# Patient Record
Sex: FEMALE | Race: WHITE | NOT HISPANIC OR LATINO | Employment: FULL TIME | URBAN - METROPOLITAN AREA
[De-identification: names, ages, dates, MRNs, and addresses within clinical notes are randomized per-mention and may not be internally consistent; named-entity substitution may affect disease eponyms.]

---

## 2023-09-22 ENCOUNTER — HOSPITAL ENCOUNTER (OUTPATIENT)
Dept: DATA CONVERSION | Facility: HOSPITAL | Age: 23
Discharge: PSYCHIATRIC OR SUBSTANCE USE FACILITY | End: 2023-09-23
Payer: COMMERCIAL

## 2023-09-22 DIAGNOSIS — F17.210 NICOTINE DEPENDENCE, CIGARETTES, UNCOMPLICATED: ICD-10-CM

## 2023-09-22 DIAGNOSIS — Z88.0 ALLERGY STATUS TO PENICILLIN: ICD-10-CM

## 2023-09-22 DIAGNOSIS — R45.851 SUICIDAL IDEATIONS: ICD-10-CM

## 2023-09-22 LAB
ALBUMIN SERPL-MCNC: 4.4 GM/DL (ref 3.5–5)
ALBUMIN/GLOB SERPL: 1.7 RATIO (ref 1.5–3)
ALP BLD-CCNC: 84 U/L (ref 35–125)
ALT SERPL-CCNC: 8 U/L (ref 5–40)
AMPHETAMINES UR QL SCN>1000 NG/ML: NEGATIVE
ANION GAP SERPL CALCULATED.3IONS-SCNC: 10 MMOL/L (ref 0–19)
AST SERPL-CCNC: 17 U/L (ref 5–40)
BARBITURATES UR QL SCN>300 NG/ML: NEGATIVE
BASOPHILS # BLD AUTO: 0.09 K/UL (ref 0–0.22)
BASOPHILS NFR BLD AUTO: 1 % (ref 0–1)
BENZODIAZ UR QL SCN>300 NG/ML: NEGATIVE
BILIRUB SERPL-MCNC: 0.9 MG/DL (ref 0.1–1.2)
BILIRUB UR QL STRIP.AUTO: ABNORMAL
BUN SERPL-MCNC: 13 MG/DL (ref 8–25)
BUN/CREAT SERPL: 26 RATIO (ref 8–21)
BZE UR QL SCN>300 NG/ML: NEGATIVE
CALCIUM SERPL-MCNC: 8.9 MG/DL (ref 8.5–10.4)
CANNABINOIDS UR QL SCN>50 NG/ML: NORMAL
CHLORIDE SERPL-SCNC: 107 MMOL/L (ref 97–107)
CLARITY UR: CLEAR
CO2 SERPL-SCNC: 23 MMOL/L (ref 24–31)
COLOR UR: YELLOW
CREAT SERPL-MCNC: 0.5 MG/DL (ref 0.4–1.6)
DEPRECATED RDW RBC AUTO: 37.2 FL (ref 37–54)
DIFFERENTIAL METHOD BLD: ABNORMAL
DRUG SCREEN COMMENT UR-IMP: NORMAL
EOSINOPHIL # BLD AUTO: 0.14 K/UL (ref 0–0.45)
EOSINOPHIL NFR BLD: 1.5 % (ref 0–3)
EPITH CASTS #/AREA UR COMP ASSIST: ABNORMAL /HPF
ERYTHROCYTE [DISTWIDTH] IN BLOOD BY AUTOMATED COUNT: 12.2 % (ref 11.7–15)
ETHANOL SERPL-MCNC: <0.01 GM/DL (ref 0–0.01)
FENTANYL+NORFENTANYL UR QL SCN: NORMAL
GFR SERPL CREATININE-BSD FRML MDRD: 133 ML/MIN/1.73 M2
GLOBULIN SER-MCNC: 2.6 G/DL (ref 1.9–3.7)
GLUCOSE SERPL-MCNC: 89 MG/DL (ref 65–99)
GLUCOSE UR STRIP.AUTO-MCNC: NEGATIVE MG/DL
HCG UR QL: NEGATIVE
HCT VFR BLD AUTO: 42.7 % (ref 36–44)
HGB BLD-MCNC: 14.2 GM/DL (ref 12–15)
HGB UR QL STRIP.AUTO: ABNORMAL /HPF (ref 0–3)
HGB UR QL: ABNORMAL
IMM GRANULOCYTES # BLD AUTO: 0.02 K/UL (ref 0–0.1)
KETONES UR QL STRIP.AUTO: NEGATIVE
LEUKOCYTE ESTERASE UR QL STRIP.AUTO: NEGATIVE
LYMPHOCYTES # BLD AUTO: 3.23 K/UL (ref 1.2–3.2)
LYMPHOCYTES NFR BLD MANUAL: 34.6 % (ref 20–40)
MCH RBC QN AUTO: 27.8 PG (ref 26–34)
MCHC RBC AUTO-ENTMCNC: 33.3 % (ref 31–37)
MCV RBC AUTO: 83.6 FL (ref 80–100)
METHADONE UR QL SCN>300 NG/ML: NEGATIVE
MONOCYTES # BLD AUTO: 0.8 K/UL (ref 0–0.8)
MONOCYTES NFR BLD MANUAL: 8.6 % (ref 0–8)
NEUTROPHILS # BLD AUTO: 5.05 K/UL
NEUTROPHILS # BLD AUTO: 5.05 K/UL (ref 1.8–7.7)
NEUTROPHILS.IMMATURE NFR BLD: 0.2 % (ref 0–1)
NEUTS SEG NFR BLD: 54.1 % (ref 50–70)
NITRITE UR QL STRIP.AUTO: NEGATIVE
NOTE (COV): NORMAL
NRBC BLD-RTO: 0 /100 WBC
OPIATES UR QL SCN>300 NG/ML: NEGATIVE
OXYCODONE UR QL: NEGATIVE
PCP UR QL SCN>25 NG/ML: NEGATIVE
PH UR STRIP.AUTO: 6.5 [PH] (ref 4.6–8)
PLATELET # BLD AUTO: 305 K/UL (ref 150–450)
PMV BLD AUTO: 11.4 CU (ref 7–12.6)
POTASSIUM SERPL-SCNC: 4.2 MMOL/L (ref 3.4–5.1)
PROT SERPL-MCNC: 7 G/DL (ref 5.9–7.9)
PROT UR STRIP.AUTO-MCNC: ABNORMAL MG/DL
RBC # BLD AUTO: 5.11 M/UL (ref 4–4.9)
REFLEX MICROSCOPIC (UA): ABNORMAL
SARS-COV-2 RNA RESP QL NAA+PROBE: NEGATIVE
SODIUM SERPL-SCNC: 140 MMOL/L (ref 133–145)
SP GR UR STRIP.AUTO: >=1.03 (ref 1–1.03)
SPECIMEN SOURCE (COV): NORMAL
UROBILINOGEN UR QL STRIP.AUTO: 0.2 MG/DL (ref 0–1)
WBC # BLD AUTO: 9.3 K/UL (ref 4.5–11)
WBC #/AREA URNS AUTO: ABNORMAL /HPF (ref 0–3)

## 2023-10-01 ENCOUNTER — HOSPITAL ENCOUNTER (EMERGENCY)
Facility: HOSPITAL | Age: 23
Discharge: OTHER INSTITUTION | End: 2023-10-01
Attending: EMERGENCY MEDICINE
Payer: COMMERCIAL

## 2023-10-01 VITALS
WEIGHT: 130.29 LBS | RESPIRATION RATE: 15 BRPM | BODY MASS INDEX: 24.6 KG/M2 | SYSTOLIC BLOOD PRESSURE: 127 MMHG | TEMPERATURE: 97.9 F | OXYGEN SATURATION: 98 % | DIASTOLIC BLOOD PRESSURE: 82 MMHG | HEART RATE: 88 BPM | HEIGHT: 61 IN

## 2023-10-01 DIAGNOSIS — F23 ACUTE PSYCHOSIS (MULTI): Primary | ICD-10-CM

## 2023-10-01 LAB
AMPHETAMINES UR QL SCN>1000 NG/ML: NEGATIVE
ANION GAP SERPL CALC-SCNC: 10 MMOL/L
BARBITURATES UR QL SCN>300 NG/ML: NEGATIVE
BASOPHILS # BLD AUTO: 0.09 X10*3/UL (ref 0–0.1)
BASOPHILS NFR BLD AUTO: 1.2 %
BENZODIAZ UR QL SCN>300 NG/ML: NEGATIVE
BUN SERPL-MCNC: 22 MG/DL (ref 8–25)
BZE UR QL SCN>300 NG/ML: NEGATIVE
CALCIUM SERPL-MCNC: 9 MG/DL (ref 8.5–10.4)
CANNABINOIDS UR QL SCN>50 NG/ML: POSITIVE
CHLORIDE SERPL-SCNC: 106 MMOL/L (ref 97–107)
CO2 SERPL-SCNC: 23 MMOL/L (ref 24–31)
CREAT SERPL-MCNC: 0.4 MG/DL (ref 0.4–1.6)
EOSINOPHIL # BLD AUTO: 0.22 X10*3/UL (ref 0–0.7)
EOSINOPHIL NFR BLD AUTO: 2.9 %
ERYTHROCYTE [DISTWIDTH] IN BLOOD BY AUTOMATED COUNT: 12.5 % (ref 11.5–14.5)
ETHANOL SERPL-MCNC: <0.01 G/DL
FENTANYL+NORFENTANYL UR QL SCN: NEGATIVE
GFR SERPL CREATININE-BSD FRML MDRD: >90 ML/MIN/1.73M*2
GLUCOSE SERPL-MCNC: 92 MG/DL (ref 65–99)
HCG UR QL IA.RAPID: NEGATIVE
HCT VFR BLD AUTO: 40.5 % (ref 36–46)
HGB BLD-MCNC: 13.2 G/DL (ref 12–16)
IMM GRANULOCYTES # BLD AUTO: 0.02 X10*3/UL (ref 0–0.7)
IMM GRANULOCYTES NFR BLD AUTO: 0.3 % (ref 0–0.9)
LYMPHOCYTES # BLD AUTO: 3.05 X10*3/UL (ref 1.2–4.8)
LYMPHOCYTES NFR BLD AUTO: 40.4 %
Lab: NORMAL
MCH RBC QN AUTO: 27.7 PG (ref 26–34)
MCHC RBC AUTO-ENTMCNC: 32.6 G/DL (ref 32–36)
MCV RBC AUTO: 85 FL (ref 80–100)
METHADONE UR QL SCN>300 NG/ML: NEGATIVE
MONOCYTES # BLD AUTO: 0.81 X10*3/UL (ref 0.1–1)
MONOCYTES NFR BLD AUTO: 10.7 %
NEUTROPHILS # BLD AUTO: 3.36 X10*3/UL (ref 1.2–7.7)
NEUTROPHILS NFR BLD AUTO: 44.5 %
NRBC BLD-RTO: 0 /100 WBCS (ref 0–0)
OPIATES UR QL SCN>300 NG/ML: NEGATIVE
OXYCODONE UR QL: NEGATIVE
PCP UR QL SCN>25 NG/ML: NEGATIVE
PLATELET # BLD AUTO: 239 X10*3/UL (ref 150–450)
PMV BLD AUTO: 11.3 FL (ref 7.5–11.5)
POTASSIUM SERPL-SCNC: 4.1 MMOL/L (ref 3.4–5.1)
RBC # BLD AUTO: 4.77 X10*6/UL (ref 4–5.2)
SARS-COV-2 RNA RESP QL NAA+PROBE: NOT DETECTED
SODIUM SERPL-SCNC: 139 MMOL/L (ref 133–145)
WBC # BLD AUTO: 7.6 X10*3/UL (ref 4.4–11.3)

## 2023-10-01 PROCEDURE — 85025 COMPLETE CBC W/AUTO DIFF WBC: CPT | Performed by: EMERGENCY MEDICINE

## 2023-10-01 PROCEDURE — 82077 ASSAY SPEC XCP UR&BREATH IA: CPT | Performed by: EMERGENCY MEDICINE

## 2023-10-01 PROCEDURE — 81025 URINE PREGNANCY TEST: CPT | Performed by: EMERGENCY MEDICINE

## 2023-10-01 PROCEDURE — 80048 BASIC METABOLIC PNL TOTAL CA: CPT | Performed by: EMERGENCY MEDICINE

## 2023-10-01 PROCEDURE — 90839 PSYTX CRISIS INITIAL 60 MIN: CPT

## 2023-10-01 PROCEDURE — 36415 COLL VENOUS BLD VENIPUNCTURE: CPT | Performed by: EMERGENCY MEDICINE

## 2023-10-01 PROCEDURE — 99285 EMERGENCY DEPT VISIT HI MDM: CPT | Mod: 25

## 2023-10-01 PROCEDURE — 87635 SARS-COV-2 COVID-19 AMP PRB: CPT | Performed by: EMERGENCY MEDICINE

## 2023-10-01 PROCEDURE — 2500000004 HC RX 250 GENERAL PHARMACY W/ HCPCS (ALT 636 FOR OP/ED)

## 2023-10-01 PROCEDURE — 99284 EMERGENCY DEPT VISIT MOD MDM: CPT | Performed by: EMERGENCY MEDICINE

## 2023-10-01 PROCEDURE — 80307 DRUG TEST PRSMV CHEM ANLYZR: CPT | Performed by: EMERGENCY MEDICINE

## 2023-10-01 RX ORDER — LORAZEPAM 2 MG/ML
INJECTION INTRAMUSCULAR
Status: COMPLETED
Start: 2023-10-01 | End: 2023-10-01

## 2023-10-01 RX ORDER — HALOPERIDOL 5 MG/ML
INJECTION INTRAMUSCULAR
Status: COMPLETED
Start: 2023-10-01 | End: 2023-10-01

## 2023-10-01 RX ADMIN — HALOPERIDOL LACTATE 5 MG: 5 INJECTION, SOLUTION INTRAMUSCULAR at 04:10

## 2023-10-01 RX ADMIN — LORAZEPAM 2 MG: 2 INJECTION, SOLUTION INTRAMUSCULAR; INTRAVENOUS at 04:10

## 2023-10-01 SDOH — HEALTH STABILITY: MENTAL HEALTH: HOW DID YOU TRY TO KILL YOURSELF?: KNIFE TO THROAT

## 2023-10-01 SDOH — HEALTH STABILITY: MENTAL HEALTH

## 2023-10-01 SDOH — HEALTH STABILITY: MENTAL HEALTH: IN THE PAST FEW WEEKS, HAVE YOU FELT THAT YOU OR YOUR FAMILY WOULD BE BETTER OFF IF YOU WERE DEAD?: YES

## 2023-10-01 SDOH — HEALTH STABILITY: MENTAL HEALTH: IN THE PAST WEEK, HAVE YOU BEEN HAVING THOUGHTS ABOUT KILLING YOURSELF?: YES

## 2023-10-01 SDOH — HEALTH STABILITY: MENTAL HEALTH: IN THE PAST FEW WEEKS, HAVE YOU WISHED YOU WERE DEAD?: YES

## 2023-10-01 SDOH — HEALTH STABILITY: MENTAL HEALTH: DEPRESSION SYMPTOMS: FEELINGS OF HOPELESSESS;FEELINGS OF WORTHLESSNESS

## 2023-10-01 SDOH — HEALTH STABILITY: MENTAL HEALTH: ARE YOU HAVING THOUGHTS OF KILLING YOURSELF RIGHT NOW?: NO

## 2023-10-01 SDOH — ECONOMIC STABILITY: HOUSING INSECURITY: FEELS SAFE LIVING IN HOME: YES

## 2023-10-01 SDOH — HEALTH STABILITY: MENTAL HEALTH: HAVE YOU EVER TRIED TO KILL YOURSELF?: YES

## 2023-10-01 ASSESSMENT — PAIN DESCRIPTION - PROGRESSION: CLINICAL_PROGRESSION: NOT CHANGED

## 2023-10-01 ASSESSMENT — PAIN - FUNCTIONAL ASSESSMENT: PAIN_FUNCTIONAL_ASSESSMENT: 0-10

## 2023-10-01 ASSESSMENT — LIFESTYLE VARIABLES
SUBSTANCE_ABUSE_PAST_12_MONTHS: NO
PRESCIPTION_ABUSE_PAST_12_MONTHS: NO

## 2023-10-01 ASSESSMENT — PAIN SCALES - GENERAL: PAINLEVEL_OUTOF10: 0 - NO PAIN

## 2023-10-01 NOTE — ED NOTES
Problem: Respiratory Impairment - Respiratory Therapy 253  Intervention: Respiratory support devices  Note: Intervention Status  Done      Pt being pinked slipped by SW, pt in restroom getting changed, pt is putting on green scrubs per hospital policy. Pt is also giving urine sample and blood work. Pt bed is being removed because pt was standing on bed when asked not to she replied just take it.    Belongings are as followed   Shirt  Shorts  Shoes   IN LOCKER NO 3.         Mechelle Guillory, EMT  10/01/23 0352

## 2023-10-01 NOTE — ED NOTES
"Pt moved into perdue with recliner because patient was in room tearing paint of wall, after bed was removed. Paramedic getting blood work and pt stated \"just shoot me or stab me with big needle'. Pt took glasses off broke them in half with her bare hands, pt throw glasses on floor.  Glasses recovered lens came out. Nurse Demar medicated pt to keep from escalating behavior with [facility] [employer] present. Pt asked for crackers \"elina crackers not regular crackers because I'm a cracker\". Kahlil giving pt warm blanket and more snacks. MERLE Guillory, EMT  10/01/23 5096       Mechelle Guillory, EMT  10/01/23 4218       Janet Benz, TUCKERN  10/01/23 6556    " Tarsorrhaphy Text: A tarsorrhaphy was performed using Frost sutures.

## 2023-10-01 NOTE — ED PROVIDER NOTES
HPI   Chief Complaint   Patient presents with    Psychiatric Evaluation     Pt brought in by PD for a voluntary eval. According to PD pt was screaming and breaking things outside and when they arrived she stated she would like help.  Pt not a good historian as to why she she is seeking help, does not respond appropriately to questions asked.       23-year-old female with a history of psychiatric disorder brought in by the police.  She was outside her house.  Screaming and breaking things.  Nursing staff spoke to her father who said she had been admitted earlier this week.  Now has symptoms of acute psychosis with hallucinations.    With speaking the patient she is on affect and behavior.  She appears to be talking to herself.  At times got agitated and violent.  Unable to obtain history from patient because of acute condition.                          No data recorded                Patient History   No past medical history on file.  No past surgical history on file.  No family history on file.  Social History     Tobacco Use    Smoking status: Not on file    Smokeless tobacco: Not on file   Substance Use Topics    Alcohol use: Not on file    Drug use: Not on file       Physical Exam   ED Triage Vitals [10/01/23 0221]   Temp Heart Rate Resp BP   36.3 °C (97.3 °F) 79 19 127/82      SpO2 Temp Source Heart Rate Source Patient Position   98 % Oral Monitor Sitting      BP Location FiO2 (%)     -- --       Physical Exam  Vitals and nursing note reviewed.     Constitutional:  Awake & alert. No acute distress.  Head: Atraumatic.  Eyes: Sclerae are anicteric and not injected.  ENT: Airway is patent.  Neck: Neck is supple and non-tender. No stridor.  Cardiovascular: Regular rate.  Pulmonary/Chest: Clear to auscultation bilaterally. No distress.  GI: Soft and non-distended. There is no discomfort with palpation.   Extremities: Full range of motion in all extremities and no deformity.  Neurological: Alert, awake.  Moving all  extremities.  No signs of injury.  Skin: Skin is warm and dry.  Psychiatric: Uncooperative.  Bizarre behavior.  Talking to herself.  Agitated.  Combative..    ED Course & MDM   Diagnoses as of 10/01/23 0818   Acute psychosis (CMS/[facility])     No complaints.  Well-appearing.    Medical Decision Making  Patient is medically cleared for evaluation by Psychiatry.  Does not have any acute medical conditions that would interfere with evaluation treatment by Psychiatry at this time. Interviewed and examined patient.  Reviewed labs.  No significant metabolic or electrolyte abnormality.  No signs of injury.  Screen positive for marijuana    Patient evaluated crisis worker.  Waiting for transfer to psychiatric facility for treatment of acute psychosis.    Required Haldol Ativan for sedation after patient was agitated    Accepted for transfer to [facility] for treatment of acute psychosis          Procedure  Procedures            Juan Pratt MD  10/01/23 7117

## 2023-10-01 NOTE — ED NOTES
Gave report to Antonieta GOLDSTEIN at [facility], [facility] was called transportation was set up.      Radha Hernández LPN  10/01/23 0858

## 2023-10-01 NOTE — ED NOTES
Pt in recliner in room sleeping. Bed frame remains out of the room per pt request.     Janet Benz, LPN  10/01/23 0617

## 2023-10-01 NOTE — SIGNIFICANT EVENT
10/01/23 0300   Arrival Details   Mode of Arrival Ambulance   Admission Source Home   Admission Type Involuntary   EPAT Assessment Start Date 10/01/23   EPAT Assessment Start Time 0312   Name of  RADHA Richard Readmission Information    Readmission within 30 Days Yes   Previous ED Visit Date and Reason  9/22/2023  (Psychosis)   Previous Discharge Date and Location 9/23/2023 [facility]   Factors Contributing to  Readmission Inpatient/ED (Team Perspective) Med Compliance/Difficulty Obtaining;Lack of Community Support;Failed to Keep Follow-Up Appointments;Discharge Plan Did Not Meet Patient Needs   Where Did You Go When You Left the Hospitals Last Time Home   Was Family/Friend Involved in Your Discharge Plan Yes   Did You Take Your Medications as Prescribed Yes   Did You Keep the Appointment Yes   What Prevented You From Going to the Appointments Patient was asked to leave IOP program due to increased behaviors   Psychiatric Symptoms   Depression Symptoms Feelings of hopelessess;Feelings of worthlessness   Crista Symptoms Poor judgment;Flight of ideas;Increased energy   Psychosis Symptoms   Delusion Type Broadcasting;Grandeur   Past Psychiatric History/Meds/Treatments   Past Psychiatric History Patient was just placed at [facility]   Support System   Support System Immediate family   Living Arrangement   Living Arrangement House;Lives with someone   Home Safety   Feels Safe Living in Home Yes   Drug Screening   Have you used any substances (canabis, cocaine, heroin, hallucinogens, inhalants, etc.) in the past 12 months? No   Have you used any prescription drugs other than prescribed in the past 12 months? No   Is a toxicology screen needed? Yes   Stage of Change   Stage of Change Precontemplation   History of Treatment Inpatient;IOP   Type of Treatment Offered Inpatient;IOP   General Appearance   Motor Activity Restlessness;Freedom of movement;Hyperactivity   Speech Pattern Excessively  loud;Perseverative;Word salad   General Attitude Cooperative;Attentive;Interested   Thought Process   Coherency Blocking;Flight of ideas;Tangential   Content Delusions   Delusions Grandeur   Perception Derealization   Cognition Impulsive;Poor judgement;Poor attention/concentration;Follows commands   Risk Factors   Self Harm/Suicidal Ideation Plan Patient endorsing some suicidal ideations. Pt has no plan   Previous Self Harm/Suicidal Plans Pt has hx of SI as well as an attempt occurring in March of this year   Risk Factors Major mental illness;Access to weapons;Lower socioeconomic status   Violence Risk Assessment   Assessment of Violence None noted   Thoughts of Harm to Others No   Ability to Assess Risk Screen   Risk Screen - Ability to Assess Able to be screened   Ask Suicide-Screening Questions   1. In the past few weeks, have you wished you were dead? Y   2. In the past few weeks, have you felt that you or your family would be better off if you were dead? Y   3. In the past week, have you been having thoughts about killing yourself? Y   4. Have you ever tried to kill yourself? Y   How did you try to kill yourself? Knife to throat   When did you try to kill yourself? MARCH 2023   5. Are you having thoughts of killing yourself right now? N   Calculated Risk Score Potential Risk   Step 1: Risk Factors   Current & Past Psychiatric Dx Mood disorder   Precipitants/Stressors Inadequate social supports   Change in Treatment Change in provider or treament (i.e., medications, psychotherapy, milieu);Recent inpatient discharge   Step 2: Protective Factors    Protective Factors Internal Fear of death or the actual act of killing self   Protective Factors External Cultural, spiritual and/or moral attitudes against suicide;Supportive social network or family or friends   Step 3: Suicidal Ideation Intensity   How Many Times Have You Had These Thoughts 3   When You Have the Thoughts How Long do They Last  2   Could/Can You Stop  "Thinking About Killing Yourself or Wanting to Die if You Want to 3   Are There Things - Anyone or Anything - That Stopped You From Wanting to Die or Acting on 3   What Sort of Reasons Did You Have For Thinking About Wanting to Die or Killing Yourself 4   Total Score 15   Step 5: Documentation   Risk Level Moderate suicide risk   Outcome/Disposition   EPAT Assessment Completed Date 10/01/23   EPAT Assessment Completed Time 0426   22yo female presents to the ED due to jose guadalupe and psychosis. Community members called PD due to pt being outside with loud volume and destroying property. Upon arrival to the ED, pt is nonsensical upon assessment. Pt has tangential speech, and illogical thoughts. She displays a lot of thought blocking and appeared somewhat internally stimulated. Client endorsed some suicidal ideations upon assessment and made statements including, \"I do not want to be here anymore.\" \"Just cut my wrist open so I can die.\" Pt denies substance usage and denies hallucinations. Per collateral from pt father, she has a dx of bipolar disorder as well as anxiety. Pt was just placed at [facility] on 9/23 and discharged on 9/28 with [facility] services. Pt attempted to go to [facility] but was discharged from program due to behaviors. Pt reports she is not compliant with medications and father reports she is prescribed Hydroxyzine PRN and Escitalopram as well as Quetiapine PRN. Pt has a therapist ([facility]). Pt has psychiatry services via [facility] ([facility]). Pt has been placed at [facility] in September 2023. Pt was also placed at [facility] in March 2023 and [facility] in January 2020 due to SI. Pt requires inpatient tx for safety and stabilization.   "

## 2023-10-01 NOTE — ED NOTES
Patient was accepted at [facility] by Dr Banks. Room 214 A. Nurse to nurse report can be called to [phone].

## 2023-12-08 ENCOUNTER — TELEPHONE (OUTPATIENT)
Dept: PRIMARY CARE | Facility: CLINIC | Age: 23
End: 2023-12-08
Payer: COMMERCIAL

## 2023-12-08 NOTE — TELEPHONE ENCOUNTER
Pt's Father Refugio called, he said the Patient is Bi [employer] and wants to know if [facility] manages [facility] [employer] medications or can refer to a mental health provider. Please advise call Refugio [phone].

## 2024-03-04 ENCOUNTER — OFFICE VISIT (OUTPATIENT)
Dept: PRIMARY CARE | Facility: CLINIC | Age: 24
End: 2024-03-04
Payer: COMMERCIAL

## 2024-03-04 ENCOUNTER — LAB (OUTPATIENT)
Dept: LAB | Facility: LAB | Age: 24
End: 2024-03-04
Payer: COMMERCIAL

## 2024-03-04 VITALS
HEART RATE: 96 BPM | DIASTOLIC BLOOD PRESSURE: 68 MMHG | WEIGHT: 143 LBS | SYSTOLIC BLOOD PRESSURE: 102 MMHG | OXYGEN SATURATION: 97 % | BODY MASS INDEX: 27.02 KG/M2

## 2024-03-04 DIAGNOSIS — R11.2 NAUSEA AND VOMITING, UNSPECIFIED VOMITING TYPE: Primary | ICD-10-CM

## 2024-03-04 DIAGNOSIS — R11.2 NAUSEA AND VOMITING, UNSPECIFIED VOMITING TYPE: ICD-10-CM

## 2024-03-04 DIAGNOSIS — R89.9 ABNORMAL LABORATORY TEST RESULT: ICD-10-CM

## 2024-03-04 PROBLEM — F31.70 BIPOLAR DISORDER IN PARTIAL REMISSION (MULTI): Status: ACTIVE | Noted: 2024-03-04

## 2024-03-04 PROBLEM — F41.9 ANXIETY: Status: ACTIVE | Noted: 2024-03-04

## 2024-03-04 LAB
ALBUMIN SERPL-MCNC: 4.3 G/DL (ref 3.5–5)
ALP BLD-CCNC: 68 U/L (ref 35–125)
ALT SERPL-CCNC: 17 U/L (ref 5–40)
AMYLASE SERPL-CCNC: 34 U/L (ref 28–100)
ANION GAP SERPL CALC-SCNC: 13 MMOL/L
APPEARANCE UR: ABNORMAL
AST SERPL-CCNC: 16 U/L (ref 5–40)
BACTERIA #/AREA URNS AUTO: ABNORMAL /HPF
BASOPHILS # BLD AUTO: 0.07 X10*3/UL (ref 0–0.1)
BASOPHILS NFR BLD AUTO: 0.7 %
BILIRUB SERPL-MCNC: 1.3 MG/DL (ref 0.1–1.2)
BILIRUB UR STRIP.AUTO-MCNC: NEGATIVE MG/DL
BUN SERPL-MCNC: 7 MG/DL (ref 8–25)
CALCIUM SERPL-MCNC: 9.4 MG/DL (ref 8.5–10.4)
CHLORIDE SERPL-SCNC: 99 MMOL/L (ref 97–107)
CO2 SERPL-SCNC: 23 MMOL/L (ref 24–31)
COLOR UR: YELLOW
CREAT SERPL-MCNC: 0.5 MG/DL (ref 0.4–1.6)
EGFRCR SERPLBLD CKD-EPI 2021: >90 ML/MIN/1.73M*2
EOSINOPHIL # BLD AUTO: 0.17 X10*3/UL (ref 0–0.7)
EOSINOPHIL NFR BLD AUTO: 1.7 %
ERYTHROCYTE [DISTWIDTH] IN BLOOD BY AUTOMATED COUNT: 12.3 % (ref 11.5–14.5)
GLUCOSE SERPL-MCNC: 71 MG/DL (ref 65–99)
GLUCOSE UR STRIP.AUTO-MCNC: NORMAL MG/DL
HCT VFR BLD AUTO: 42.7 % (ref 36–46)
HGB BLD-MCNC: 14.1 G/DL (ref 12–16)
IMM GRANULOCYTES # BLD AUTO: 0.03 X10*3/UL (ref 0–0.7)
IMM GRANULOCYTES NFR BLD AUTO: 0.3 % (ref 0–0.9)
KETONES UR STRIP.AUTO-MCNC: ABNORMAL MG/DL
LEUKOCYTE ESTERASE UR QL STRIP.AUTO: ABNORMAL
LIPASE SERPL-CCNC: 26 U/L (ref 16–63)
LYMPHOCYTES # BLD AUTO: 3.38 X10*3/UL (ref 1.2–4.8)
LYMPHOCYTES NFR BLD AUTO: 33.5 %
MCH RBC QN AUTO: 27.9 PG (ref 26–34)
MCHC RBC AUTO-ENTMCNC: 33 G/DL (ref 32–36)
MCV RBC AUTO: 84 FL (ref 80–100)
MONOCYTES # BLD AUTO: 0.79 X10*3/UL (ref 0.1–1)
MONOCYTES NFR BLD AUTO: 7.8 %
MUCOUS THREADS #/AREA URNS AUTO: ABNORMAL /LPF
NEUTROPHILS # BLD AUTO: 5.64 X10*3/UL (ref 1.2–7.7)
NEUTROPHILS NFR BLD AUTO: 56 %
NITRITE UR QL STRIP.AUTO: NEGATIVE
NRBC BLD-RTO: 0 /100 WBCS (ref 0–0)
PH UR STRIP.AUTO: 6.5 [PH]
PLATELET # BLD AUTO: 300 X10*3/UL (ref 150–450)
POTASSIUM SERPL-SCNC: 5 MMOL/L (ref 3.4–5.1)
PROT SERPL-MCNC: 7 G/DL (ref 5.9–7.9)
PROT UR STRIP.AUTO-MCNC: ABNORMAL MG/DL
RBC # BLD AUTO: 5.06 X10*6/UL (ref 4–5.2)
RBC # UR STRIP.AUTO: NEGATIVE /UL
RBC #/AREA URNS AUTO: ABNORMAL /HPF
SODIUM SERPL-SCNC: 135 MMOL/L (ref 133–145)
SP GR UR STRIP.AUTO: 1.02
SQUAMOUS #/AREA URNS AUTO: ABNORMAL /HPF
UROBILINOGEN UR STRIP.AUTO-MCNC: NORMAL MG/DL
WBC # BLD AUTO: 10.1 X10*3/UL (ref 4.4–11.3)
WBC #/AREA URNS AUTO: ABNORMAL /HPF
YEAST BUDDING #/AREA UR COMP ASSIST: PRESENT /HPF

## 2024-03-04 PROCEDURE — 81001 URINALYSIS AUTO W/SCOPE: CPT

## 2024-03-04 PROCEDURE — 36415 COLL VENOUS BLD VENIPUNCTURE: CPT

## 2024-03-04 PROCEDURE — 80074 ACUTE HEPATITIS PANEL: CPT

## 2024-03-04 PROCEDURE — 82150 ASSAY OF AMYLASE: CPT

## 2024-03-04 PROCEDURE — 80053 COMPREHEN METABOLIC PANEL: CPT

## 2024-03-04 PROCEDURE — 99214 OFFICE O/P EST MOD 30 MIN: CPT | Performed by: INTERNAL MEDICINE

## 2024-03-04 PROCEDURE — 82977 ASSAY OF GGT: CPT

## 2024-03-04 PROCEDURE — 83690 ASSAY OF LIPASE: CPT

## 2024-03-04 PROCEDURE — 4004F PT TOBACCO SCREEN RCVD TLK: CPT | Performed by: INTERNAL MEDICINE

## 2024-03-04 PROCEDURE — 85025 COMPLETE CBC W/AUTO DIFF WBC: CPT

## 2024-03-04 RX ORDER — PANTOPRAZOLE SODIUM 40 MG/1
40 TABLET, DELAYED RELEASE ORAL DAILY
Qty: 30 TABLET | Refills: 5 | Status: SHIPPED | OUTPATIENT
Start: 2024-03-04 | End: 2024-08-31

## 2024-03-04 RX ORDER — HYDROXYZINE PAMOATE 50 MG/1
50 CAPSULE ORAL 3 TIMES DAILY PRN
COMMUNITY
Start: 2023-11-07

## 2024-03-04 RX ORDER — CARIPRAZINE 1.5 MG/1
1.5 CAPSULE, GELATIN COATED ORAL EVERY MORNING
COMMUNITY
Start: 2024-02-28

## 2024-03-04 RX ORDER — ESCITALOPRAM OXALATE 5 MG/1
5 TABLET ORAL
COMMUNITY
Start: 2024-02-28

## 2024-03-04 RX ORDER — BENZTROPINE MESYLATE 0.5 MG/1
0.5 TABLET ORAL DAILY
COMMUNITY
Start: 2024-02-28

## 2024-03-04 ASSESSMENT — ENCOUNTER SYMPTOMS
OCCASIONAL FEELINGS OF UNSTEADINESS: 0
LOSS OF SENSATION IN FEET: 0
DEPRESSION: 0

## 2024-03-04 ASSESSMENT — PATIENT HEALTH QUESTIONNAIRE - PHQ9
1. LITTLE INTEREST OR PLEASURE IN DOING THINGS: NOT AT ALL
SUM OF ALL RESPONSES TO PHQ9 QUESTIONS 1 AND 2: 0
2. FEELING DOWN, DEPRESSED OR HOPELESS: NOT AT ALL

## 2024-03-04 ASSESSMENT — PAIN SCALES - GENERAL: PAINLEVEL: 0-NO PAIN

## 2024-03-04 NOTE — PROGRESS NOTES
Subjective   Patient ID: Hortensia Castillo is a 23 y.o. female who presents for Vomiting.    Here with mom    For last 2 weeks has been vomiting. Negative home covid test. Appetite ok but about 1 hr later will vomit-isn't occurring every time she eats. Feels nauseated before throws up--will have burning sensation in middle of chest before she throws up. Denies any abdominal pain. Not losing weight    Was in psychiatric facility in Oct then residential facility. Cogentin added about 6 wks ago--was on it few weeks before vomiting started    Tobacco use--continues to smoke        Review of Systems   Genitourinary:         No chance of pregnancy-no sex in 2 yrs       Objective   /68 (BP Location: Left arm, Patient Position: Sitting)   Pulse 96   Wt 64.9 kg (143 lb)   SpO2 97%   BMI 27.02 kg/m²     Physical Exam  Constitutional:       General: She is not in acute distress.     Appearance: She is not ill-appearing.   HENT:      Mouth/Throat:      Comments: Moist mucus membranes  Abdominal:      General: Abdomen is flat. Bowel sounds are normal. There is no distension.      Palpations: Abdomen is soft.      Tenderness: There is no abdominal tenderness.         Assessment/Plan   Diagnoses and all orders for this visit:  Nausea and vomiting, unspecified vomiting type  -     CBC and Auto Differential; Future  -     Comprehensive Metabolic Panel; Future  -     Amylase; Future  -     Lipase; Future  -     Urinalysis with Reflex Microscopic; Future  -     pantoprazole (ProtoNix) 40 mg EC tablet; Take 1 tablet (40 mg) by mouth once daily. Do not crush, chew, or split.    Will check labs while starting protonix

## 2024-03-04 NOTE — LETTER
March 6, 2024     Patient: Hortensia Castillo   YOB: 2000   Date of Visit: 3/4/2024       To Whom It May Concern:    Hortensia Castillo was seen in my clinic on 3/4/2024 at 2:15 pm. Please excuse Hortensia for her absence from work on this day to make the appointment.    If you have any questions or concerns, please don't hesitate to call.         Sincerely,         Carmita Martinez MD        CC: No Recipients

## 2024-03-05 ENCOUNTER — TELEPHONE (OUTPATIENT)
Dept: PRIMARY CARE | Facility: CLINIC | Age: 24
End: 2024-03-05
Payer: COMMERCIAL

## 2024-03-05 DIAGNOSIS — R11.10 VOMITING, UNSPECIFIED VOMITING TYPE, UNSPECIFIED WHETHER NAUSEA PRESENT: ICD-10-CM

## 2024-03-05 DIAGNOSIS — R89.9 ABNORMAL LABORATORY TEST RESULT: ICD-10-CM

## 2024-03-05 DIAGNOSIS — N39.0 ACUTE UTI: Primary | ICD-10-CM

## 2024-03-05 LAB
GGT SERPL-CCNC: 14 U/L (ref 5–55)
HAV IGM SER QL: NONREACTIVE
HBV CORE IGM SER QL: NONREACTIVE
HBV SURFACE AG SERPL QL IA: NONREACTIVE
HCV AB SER QL: NONREACTIVE

## 2024-03-05 RX ORDER — FLUCONAZOLE 150 MG/1
150 TABLET ORAL ONCE
Qty: 1 TABLET | Refills: 0 | Status: SHIPPED | OUTPATIENT
Start: 2024-03-05 | End: 2024-03-05

## 2024-03-05 RX ORDER — SULFAMETHOXAZOLE AND TRIMETHOPRIM 800; 160 MG/1; MG/1
1 TABLET ORAL 2 TIMES DAILY
Qty: 6 TABLET | Refills: 0 | Status: SHIPPED | OUTPATIENT
Start: 2024-03-05 | End: 2024-03-08

## 2024-03-05 NOTE — RESULT ENCOUNTER NOTE
Bilirubin elevated-add hepatitis panel, GGTP and RUQ US; shows a bladder infection and UTI-where does he want pills

## 2024-03-06 ENCOUNTER — TELEPHONE (OUTPATIENT)
Dept: PRIMARY CARE | Facility: CLINIC | Age: 24
End: 2024-03-06
Payer: COMMERCIAL

## 2024-03-11 ENCOUNTER — HOSPITAL ENCOUNTER (OUTPATIENT)
Dept: RADIOLOGY | Facility: HOSPITAL | Age: 24
Discharge: HOME | End: 2024-03-11
Payer: COMMERCIAL

## 2024-03-11 DIAGNOSIS — R89.9 ABNORMAL LABORATORY TEST RESULT: ICD-10-CM

## 2024-03-11 DIAGNOSIS — R11.10 VOMITING, UNSPECIFIED VOMITING TYPE, UNSPECIFIED WHETHER NAUSEA PRESENT: ICD-10-CM

## 2024-03-11 PROCEDURE — 76705 ECHO EXAM OF ABDOMEN: CPT

## 2024-03-11 PROCEDURE — 76705 ECHO EXAM OF ABDOMEN: CPT | Performed by: RADIOLOGY

## 2024-03-18 ENCOUNTER — OFFICE VISIT (OUTPATIENT)
Dept: PRIMARY CARE | Facility: CLINIC | Age: 24
End: 2024-03-18
Payer: COMMERCIAL

## 2024-03-18 VITALS
DIASTOLIC BLOOD PRESSURE: 70 MMHG | SYSTOLIC BLOOD PRESSURE: 110 MMHG | OXYGEN SATURATION: 98 % | WEIGHT: 155 LBS | BODY MASS INDEX: 29.29 KG/M2 | HEART RATE: 98 BPM

## 2024-03-18 DIAGNOSIS — R11.11 VOMITING WITHOUT NAUSEA, UNSPECIFIED VOMITING TYPE: Primary | ICD-10-CM

## 2024-03-18 PROCEDURE — 99213 OFFICE O/P EST LOW 20 MIN: CPT | Performed by: INTERNAL MEDICINE

## 2024-03-18 PROCEDURE — 4004F PT TOBACCO SCREEN RCVD TLK: CPT | Performed by: INTERNAL MEDICINE

## 2024-03-18 ASSESSMENT — ENCOUNTER SYMPTOMS
DEPRESSION: 0
LOSS OF SENSATION IN FEET: 0
OCCASIONAL FEELINGS OF UNSTEADINESS: 0

## 2024-03-18 ASSESSMENT — PATIENT HEALTH QUESTIONNAIRE - PHQ9
SUM OF ALL RESPONSES TO PHQ9 QUESTIONS 1 AND 2: 0
1. LITTLE INTEREST OR PLEASURE IN DOING THINGS: NOT AT ALL
2. FEELING DOWN, DEPRESSED OR HOPELESS: NOT AT ALL

## 2024-03-18 ASSESSMENT — PAIN SCALES - GENERAL: PAINLEVEL: 0-NO PAIN

## 2024-03-18 ASSESSMENT — COLUMBIA-SUICIDE SEVERITY RATING SCALE - C-SSRS
1. IN THE PAST MONTH, HAVE YOU WISHED YOU WERE DEAD OR WISHED YOU COULD GO TO SLEEP AND NOT WAKE UP?: NO
2. HAVE YOU ACTUALLY HAD ANY THOUGHTS OF KILLING YOURSELF?: NO

## 2024-03-18 NOTE — PROGRESS NOTES
Subjective   Patient ID: Hortensia Castillo is a 24 y.o. female who presents for Follow-up.    Still vomitting in the mornings. Almost immediately on rising and no nausea,  Not every morning. Hasn't talked to psych about this in detail. Again denies any pain. Doesn't occur elsewhere in the day. Upset that she's gaining weight. Taking protonix each morning, finished ATB and still vomitting    Reviewed labs and US with patient and mother-no answer there              Objective   /70   Pulse 98   Wt 70.3 kg (155 lb)   SpO2 98%   BMI 29.29 kg/m²     Physical Exam won't meet my eyes    Assessment/Plan   Diagnoses and all orders for this visit:  Vomiting without nausea, unspecified vomiting type    Since occurs on rising-anti-nausea pills likely won't help. Would advise discussing further with psych-? Med SE vs anxiety induced.  If no improvement, will refer for EGD

## 2024-04-29 ENCOUNTER — LAB (OUTPATIENT)
Dept: LAB | Facility: LAB | Age: 24
End: 2024-04-29
Payer: COMMERCIAL

## 2024-04-29 DIAGNOSIS — Z13.79 ENCOUNTER FOR OTHER SCREENING FOR GENETIC AND CHROMOSOMAL ANOMALIES: ICD-10-CM

## 2024-04-29 DIAGNOSIS — Z34.02 ENCOUNTER FOR SUPERVISION OF NORMAL FIRST PREGNANCY, SECOND TRIMESTER (HHS-HCC): Primary | ICD-10-CM

## 2024-04-29 LAB
ABO GROUP (TYPE) IN BLOOD: NORMAL
ANTIBODY SCREEN: NORMAL
ERYTHROCYTE [DISTWIDTH] IN BLOOD BY AUTOMATED COUNT: 12.6 % (ref 11.5–14.5)
EST. AVERAGE GLUCOSE BLD GHB EST-MCNC: 97 MG/DL
HBA1C MFR BLD: 5 %
HBV SURFACE AG SERPL QL IA: NONREACTIVE
HCT VFR BLD AUTO: 33.8 % (ref 36–46)
HCV AB SER QL: NONREACTIVE
HGB BLD-MCNC: 11.2 G/DL (ref 12–16)
MCH RBC QN AUTO: 28.2 PG (ref 26–34)
MCHC RBC AUTO-ENTMCNC: 33.1 G/DL (ref 32–36)
MCV RBC AUTO: 85 FL (ref 80–100)
NRBC BLD-RTO: 0 /100 WBCS (ref 0–0)
PLATELET # BLD AUTO: 245 X10*3/UL (ref 150–450)
RBC # BLD AUTO: 3.97 X10*6/UL (ref 4–5.2)
RH FACTOR (ANTIGEN D): NORMAL
WBC # BLD AUTO: 9.5 X10*3/UL (ref 4.4–11.3)

## 2024-04-29 PROCEDURE — 87800 DETECT AGNT MULT DNA DIREC: CPT

## 2024-04-29 PROCEDURE — 87340 HEPATITIS B SURFACE AG IA: CPT

## 2024-04-29 PROCEDURE — 88175 CYTOPATH C/V AUTO FLUID REDO: CPT

## 2024-04-29 PROCEDURE — 87389 HIV-1 AG W/HIV-1&-2 AB AG IA: CPT

## 2024-04-29 PROCEDURE — 86780 TREPONEMA PALLIDUM: CPT

## 2024-04-29 PROCEDURE — 86900 BLOOD TYPING SEROLOGIC ABO: CPT

## 2024-04-29 PROCEDURE — 86901 BLOOD TYPING SEROLOGIC RH(D): CPT

## 2024-04-29 PROCEDURE — 86317 IMMUNOASSAY INFECTIOUS AGENT: CPT

## 2024-04-29 PROCEDURE — 85027 COMPLETE CBC AUTOMATED: CPT

## 2024-04-29 PROCEDURE — 86803 HEPATITIS C AB TEST: CPT

## 2024-04-29 PROCEDURE — 86850 RBC ANTIBODY SCREEN: CPT

## 2024-04-29 PROCEDURE — 36415 COLL VENOUS BLD VENIPUNCTURE: CPT

## 2024-04-29 PROCEDURE — 83036 HEMOGLOBIN GLYCOSYLATED A1C: CPT

## 2024-04-30 ENCOUNTER — LAB REQUISITION (OUTPATIENT)
Dept: LAB | Facility: HOSPITAL | Age: 24
End: 2024-04-30
Payer: COMMERCIAL

## 2024-04-30 ENCOUNTER — TELEPHONE (OUTPATIENT)
Dept: PRIMARY CARE | Facility: CLINIC | Age: 24
End: 2024-04-30
Payer: COMMERCIAL

## 2024-04-30 DIAGNOSIS — Z01.419 ENCOUNTER FOR GYNECOLOGICAL EXAMINATION (GENERAL) (ROUTINE) WITHOUT ABNORMAL FINDINGS: ICD-10-CM

## 2024-04-30 DIAGNOSIS — Z11.51 ENCOUNTER FOR SCREENING FOR HUMAN PAPILLOMAVIRUS (HPV): ICD-10-CM

## 2024-04-30 DIAGNOSIS — Z11.3 ENCOUNTER FOR SCREENING FOR INFECTIONS WITH A PREDOMINANTLY SEXUAL MODE OF TRANSMISSION: ICD-10-CM

## 2024-04-30 LAB
C TRACH RRNA SPEC QL NAA+PROBE: NEGATIVE
HIV 1+2 AB+HIV1 P24 AG SERPL QL IA: NONREACTIVE
N GONORRHOEA DNA SPEC QL PROBE+SIG AMP: NEGATIVE
RUBV IGG SERPL IA-ACNC: 1.4 IA
RUBV IGG SERPL QL IA: POSITIVE
SCAN RESULT: NORMAL
TREPONEMA PALLIDUM IGG+IGM AB [PRESENCE] IN SERUM OR PLASMA BY IMMUNOASSAY: NONREACTIVE

## 2024-04-30 NOTE — TELEPHONE ENCOUNTER
Patient stated she is 18 weeks pregnant and inquired if she should continue taking Pantoprazole. Please advise.

## 2024-05-01 LAB — SCAN RESULT: NORMAL

## 2024-05-08 LAB
CYTOLOGY CMNT CVX/VAG CYTO-IMP: NORMAL
LAB AP HPV GENOTYPE QUESTION: YES
LAB AP HPV HR: NORMAL
LAB AP PAP ADDITIONAL TESTS: NORMAL
LABORATORY COMMENT REPORT: NORMAL
LMP START DATE: NORMAL
PATH REPORT.TOTAL CANCER: NORMAL

## 2024-06-28 ENCOUNTER — LAB (OUTPATIENT)
Dept: LAB | Facility: LAB | Age: 24
End: 2024-06-28
Payer: COMMERCIAL

## 2024-06-28 DIAGNOSIS — Z34.02 ENCOUNTER FOR SUPERVISION OF NORMAL FIRST PREGNANCY, SECOND TRIMESTER (HHS-HCC): Primary | ICD-10-CM

## 2024-06-28 LAB
ERYTHROCYTE [DISTWIDTH] IN BLOOD BY AUTOMATED COUNT: 12.9 % (ref 11.5–14.5)
GLUCOSE 1H P GLC SERPL-MCNC: 151 MG/DL (ref 65–139)
HCT VFR BLD AUTO: 32.9 % (ref 36–46)
HGB BLD-MCNC: 10.4 G/DL (ref 12–16)
MCH RBC QN AUTO: 28 PG (ref 26–34)
MCHC RBC AUTO-ENTMCNC: 31.6 G/DL (ref 32–36)
MCV RBC AUTO: 88 FL (ref 80–100)
NRBC BLD-RTO: 0 /100 WBCS (ref 0–0)
PLATELET # BLD AUTO: 230 X10*3/UL (ref 150–450)
RBC # BLD AUTO: 3.72 X10*6/UL (ref 4–5.2)
WBC # BLD AUTO: 9 X10*3/UL (ref 4.4–11.3)

## 2024-06-28 PROCEDURE — 36415 COLL VENOUS BLD VENIPUNCTURE: CPT

## 2024-06-28 PROCEDURE — 82947 ASSAY GLUCOSE BLOOD QUANT: CPT

## 2024-06-28 PROCEDURE — 85027 COMPLETE CBC AUTOMATED: CPT

## 2024-07-01 DIAGNOSIS — R73.09 OTHER ABNORMAL GLUCOSE: Primary | ICD-10-CM

## 2024-07-03 ENCOUNTER — LAB (OUTPATIENT)
Dept: LAB | Facility: LAB | Age: 24
End: 2024-07-03
Payer: COMMERCIAL

## 2024-07-03 DIAGNOSIS — R73.09 OTHER ABNORMAL GLUCOSE: ICD-10-CM

## 2024-07-03 LAB
GLUCOSE 1H P GLC SERPL-MCNC: 193 MG/DL (ref 65–139)
GLUCOSE 2H P GLC SERPL-MCNC: 186 MG/DL (ref 65–139)
GLUCOSE 3H P GLC SERPL-MCNC: 107 MG/DL (ref 65–139)
GLUCOSE P FAST SERPL-MCNC: 82 MG/DL (ref 69–99)

## 2024-07-03 PROCEDURE — 36415 COLL VENOUS BLD VENIPUNCTURE: CPT

## 2024-07-03 PROCEDURE — 82952 GTT-ADDED SAMPLES: CPT

## 2024-07-03 PROCEDURE — 82951 GLUCOSE TOLERANCE TEST (GTT): CPT

## 2024-07-03 PROCEDURE — 82947 ASSAY GLUCOSE BLOOD QUANT: CPT

## 2024-07-03 PROCEDURE — 82950 GLUCOSE TEST: CPT

## 2024-07-09 ENCOUNTER — TELEPHONE (OUTPATIENT)
Dept: MATERNAL FETAL MEDICINE | Facility: CLINIC | Age: 24
End: 2024-07-09
Payer: COMMERCIAL

## 2024-07-09 NOTE — TELEPHONE ENCOUNTER
Spoke w/ pt's Mom Velvet. Mom requested callback r/t confusion abt date and time of Nutrition consult. (At this time there does not appear to be a Nutrition visit scheduled per pt at any location on 7/16 at 8:45am.)      GDM telephone Nutrition consult scheduled: Thursday 7/11 @ 2:30pm.

## 2024-07-11 ENCOUNTER — NUTRITION (OUTPATIENT)
Dept: OBSTETRICS AND GYNECOLOGY | Facility: CLINIC | Age: 24
End: 2024-07-11
Payer: COMMERCIAL

## 2024-07-11 NOTE — PROGRESS NOTES
Nutrition Assessment     Reason for Visit:  Hortensia Castillo is a 24 y.o. female who presents for Gestational Diabetes     Food And Nutrient Intake:  Food and Nutrient History  Food and Nutrient History: Per pt, reports the following BGs:   F: 88, 95, 106 w/HS ice cream yesterday  B: 122,164 w/Cheerios and 1/2 tsp sugar  L: 117, 89, 130  D: 473 w/tacos, 2 bags chips, 2 bottles raspberry iced tea, 107.     Bedtime: 12am- 2am      Nutrition Diagnosis      Nutrition Diagnosis  Patient has Nutrition Diagnosis: Yes  Nutrition Diagnosis 1: Food and nutrition related knowledge deficit  Related to (1): lack of prior/recent nutrition counseling r/t BG control in pregnancy  As Evidenced by (1): intial nutrition consult    Nutrition Interventions/Recommendations   Food and Nutrition Delivery  Meals & Snacks: Carbohydrate-modified diet  Goals: Recommended Daily carbohydrate distribution:    Breakfast: 1-2 carbohydrate servings (15-30g CHO) --  avoid concentrated sweets/high GI foods: fruit, fruit juices, cold cereal and milk, syrup, jams and jellies, etc.  AM Snack:  1-2 servings   Lunch:  3-4 servings (45-60g CHO)  Midday Snack:  1-2 servings   Dinner: 3-4 servings   HS Snack:  2 servings (30g)    -Be sure to add protein food to carb choices at each meal and snack: cheese, meat, eggs, nuts, peanut butter, etc    -During the day, eat about every 2 - 4 hours.     -Avoid more than 8-10 hours overnight without eating. If more than about 2hrs between dinner and bedtime, have a carb plus protein snack.     -Higher fiber carbohydrates/whole grains are preferred over refined carb choices.    -- Avoid juices and sugar-sweetened beverages. Diet drinks are fine. Try eliminating milk at mealtime if noticing after-meal BG elevations. Unsweetened almond or soymilk beverages are a lower-carb dairy alternative.    -Recommended Dietary Allowance for carbohydrates during pregnancy is a minimum of 175g/day (11-12 servings) to provide 33g/day for  fetal brain development. Many women do well eating a bit less than 175g CHO/day--this is fine, as long as there is no purposeful, severe restriction of carb foods at meals and snacks (ex: Keto or Atkins-style diets for BG control) and no symptoms indicating inadequate carb intake-- loss of weight, feeling dizzy/lightheaded, irritable, confused, excessively hungry, etc.    Pt Education Materials emailed: Yogurt Options; GDM Sample Meals and Snacks.    M visit 7/23. Pt will call or email to advise Nutrition if FBGs remain elevated or continue to increase.     Nutrition Monitoring and Evaluation   Biochemical Data, Medical Tests and Procedures  Monitoring and Evaluation Plan: Glucose/endocrine profile  Glucose/Endocrine Profile: Glucose, fasting, Other (Comment)  Criteria: Fasting BG 95 or less; 1hr post-meal  or less. Blood sugars [facility] for [facility]. Follow-up as needed to assess goal attainment. Modifications based on further assessment and self-blood glucose monitoring results. Pt expresses understanding and agreement.          Time Spent  Prep time on day of patient encounter: 0 minutes  Time spent directly with patient, family or caregiver: 30 minutes  Additional Time Spent on Patient Care Activities: 5 minutes  Documentation Time: 10 minutes  Other Time Spent: 0 minutes  Total: 45 minutes

## 2024-07-16 ENCOUNTER — APPOINTMENT (OUTPATIENT)
Dept: RADIOLOGY | Facility: CLINIC | Age: 24
End: 2024-07-16
Payer: COMMERCIAL

## 2024-07-16 ENCOUNTER — APPOINTMENT (OUTPATIENT)
Dept: MATERNAL FETAL MEDICINE | Facility: CLINIC | Age: 24
End: 2024-07-16
Payer: COMMERCIAL

## 2024-07-23 ENCOUNTER — DOCUMENTATION (OUTPATIENT)
Dept: MATERNAL FETAL MEDICINE | Facility: CLINIC | Age: 24
End: 2024-07-23
Payer: COMMERCIAL

## 2024-07-23 ENCOUNTER — INITIAL PRENATAL (OUTPATIENT)
Dept: MATERNAL FETAL MEDICINE | Facility: CLINIC | Age: 24
End: 2024-07-23
Payer: COMMERCIAL

## 2024-07-23 ENCOUNTER — HOSPITAL ENCOUNTER (OUTPATIENT)
Dept: RADIOLOGY | Facility: CLINIC | Age: 24
Discharge: HOME | End: 2024-07-23
Payer: COMMERCIAL

## 2024-07-23 DIAGNOSIS — O24.419 GESTATIONAL DIABETES MELLITUS (GDM) IN THIRD TRIMESTER, GESTATIONAL DIABETES METHOD OF CONTROL UNSPECIFIED (HHS-HCC): ICD-10-CM

## 2024-07-23 DIAGNOSIS — Z3A.30 30 WEEKS GESTATION OF PREGNANCY (HHS-HCC): Primary | ICD-10-CM

## 2024-07-23 DIAGNOSIS — O24.414 INSULIN CONTROLLED GESTATIONAL DIABETES MELLITUS (GDM) DURING PREGNANCY, ANTEPARTUM (HHS-HCC): Primary | ICD-10-CM

## 2024-07-23 DIAGNOSIS — O24.414 INSULIN CONTROLLED GESTATIONAL DIABETES MELLITUS (GDM) DURING PREGNANCY, ANTEPARTUM (HHS-HCC): ICD-10-CM

## 2024-07-23 DIAGNOSIS — F31.70 BIPOLAR DISORDER IN PARTIAL REMISSION, MOST RECENT EPISODE UNSPECIFIED TYPE (MULTI): ICD-10-CM

## 2024-07-23 LAB — GLUCOSE BLD MANUAL STRIP-MCNC: 82 MG/DL (ref 74–99)

## 2024-07-23 PROCEDURE — 99215 OFFICE O/P EST HI 40 MIN: CPT | Mod: GC,25 | Performed by: OBSTETRICS & GYNECOLOGY

## 2024-07-23 PROCEDURE — 76811 OB US DETAILED SNGL FETUS: CPT

## 2024-07-23 PROCEDURE — 76819 FETAL BIOPHYS PROFIL W/O NST: CPT

## 2024-07-23 PROCEDURE — 99205 OFFICE O/P NEW HI 60 MIN: CPT | Performed by: OBSTETRICS & GYNECOLOGY

## 2024-07-23 PROCEDURE — 76811 OB US DETAILED SNGL FETUS: CPT | Performed by: OBSTETRICS & GYNECOLOGY

## 2024-07-23 PROCEDURE — 76819 FETAL BIOPHYS PROFIL W/O NST: CPT | Performed by: OBSTETRICS & GYNECOLOGY

## 2024-07-23 PROCEDURE — 82947 ASSAY GLUCOSE BLOOD QUANT: CPT | Performed by: OBSTETRICS & GYNECOLOGY

## 2024-07-23 RX ORDER — HUMAN INSULIN 100 [IU]/ML
INJECTION, SUSPENSION SUBCUTANEOUS
Qty: 15 ML | Refills: 3 | Status: SHIPPED | OUTPATIENT
Start: 2024-07-23

## 2024-07-23 RX ORDER — BLOOD SUGAR DIAGNOSTIC
30 STRIP MISCELLANEOUS DAILY
Qty: 30 EACH | Refills: 3 | Status: SHIPPED | OUTPATIENT
Start: 2024-07-23 | End: 2024-07-23 | Stop reason: SDUPTHER

## 2024-07-23 RX ORDER — ISOPROPYL ALCOHOL 70 ML/100ML
SWAB TOPICAL
Qty: 200 EACH | Refills: 3 | Status: SHIPPED | OUTPATIENT
Start: 2024-07-23

## 2024-07-23 RX ORDER — BLOOD SUGAR DIAGNOSTIC
STRIP MISCELLANEOUS
Qty: 100 EACH | Refills: 1 | Status: SHIPPED | OUTPATIENT
Start: 2024-07-23

## 2024-07-23 RX ORDER — PEN NEEDLE, DIABETIC 32 GX 1/4"
NEEDLE, DISPOSABLE MISCELLANEOUS
Refills: 3 | OUTPATIENT
Start: 2024-07-23

## 2024-07-23 NOTE — LETTER
2024     Mariela Wasserman MD  [address] [place] [address]  [address]  [place] [address]    Patient: Hortensia Castillo   YOB: 2000   Date of Visit: 2024       Dear Dr. Mariela Wasserman MD:    Thank you for referring Hortensia Castillo to me for evaluation. Below are my notes for this consultation.  If you have questions, please do not hesitate to call me. I look forward to following your patient along with you.       Sincerely,     Andre Reyna MD      CC: No Recipients  ______________________________________________________________________________________    2024   Hortensia Castillo     MFM CONSULT NOTE  Referring provider: Mariela Wasserman at [facility]  Reason for referral: GDM    HPI: Hortensia Castillo is a 24 y.o.  at Unknown here for consult for GDM. Denies contractions, bleeding, or LOF. Reports normal fetal movement    Patient reports that she has been doing okay with adjusting to checking her sugars everyday. It's a big learning curve but she has her BG log with her today. Meal times BF ~7-9 am, lunch 11-1 pm, dinner 4-6 pm. Snacks during the day (triscuits, cheese, fruit). Has talked to Dalia over the phone. Reports that she has mac and cheese and ice cream around 10 pm - midnight some days. Checks fasting 7-9 am, and rest of her BS one hour after meals.     BS log: Fastin - 193,  -168, lunch 89 -190, dinner 107 - 473    Other history as noted below, reports that she does have anxiety/depression and mild BPD that she currently takes medications for and is engaged in treatment with psychologists. Does report some daily vaping and has used marijuana once during this pregnancy. Has an anatomy US scheduled for later today. Planning on an open adoption and will be breastfeeding initially a little bit.     Pregnancy also notable for:  Anemia (on PO iron)     Other pregnancy complications include   Patient Active Problem List   Diagnosis   • Bipolar disorder in partial remission (Multi)   • Anxiety   •  Gestational diabetes mellitus (GDM), antepartum ([facility])   • 30 weeks gestation of pregnancy ([facility])         10 point review of system is negative except as above    OB History          1    Para        Term                AB        Living             SAB        IAB        Ectopic        Multiple        Live Births                   GYN hx:   Denies any surgeries to uterus/vagina  Hx of trichomonas (2019)  Denies HSV    Past medical history: Denies HTN, DM, asthma or thyroid issues  - Depression, anxiety, mild BPD - engaged w/care with psychologist    Surghx: wisdom teeth ~    Medications: ferrous sulfate 325 bid , vraylar 1.5 mg, Lexapro 5 mg, PNV    Allergies   Allergen Reactions   • Abilify [Aripiprazole] Other     Tardive dyskinesia   • Penicillin G Rash   • Zyrtec [Cetirizine] Rash       Social History     Tobacco Use   • Smoking status: Every Day     Current packs/day: 0.25     Types: Cigarettes   • Smokeless tobacco: Never   Vaping Use   • Vaping status: Every Day   • Substances: Nicotine   • Devices: Disposable   Substance Use Topics   • Alcohol use: Never   • Drug use: Never   Works as a  at [employer]    family history is not on file. She was adopted.      OBJECTIVE  Visit Vitals  OB Status Pregnant   Smoking Status Every Day       Physical exam  BP: 125/87  FHT: 139    ASSESSMENT & PLAN    Hortensia Castillo is a 24 y.o.  at 30w0d here for the following:    A2DM  Patient was recently diagnosed with gestational diabetes (GDM).  We discussed the pregnancy implications of the diagnosis including increased risk of pre-eclampsia, LGA/macrosomia, shoulder dystocia, stillbirth as well as  hypoglycemia, hyperbilirubinemia and respiratory distress.  We reviewed the importance of glycemic control and its impact on lowering these risks.    Discussed management ranging from dietary changes to pharmacotherapy and that insulin is considered first line therapy rather than oral agents  if medication is ultimately needed.    Discussed our recommendation for serial growth ultrasounds and starting twice weekly  testing if medication treatment is needed.  We also stressed the potential persistence of diabetes post pregnancy and recommendations for screening post-partum and, if normal, routine screening every 3 years. We did discuss that a healthy diet and maintenance of a normal body weight may reduce those risks.  Blood sugars reviewed, notable for: POC 82 (cereal ~9am this AM)  - Fastin - 193 (13/15 abnormal),  -168 (5/ abnormal), lunch 89 -190 (3/10 abnormal), dinner 107 - 473 ( abormal - 473 likely erroneously high)  Current treatment plan: diet, though given elevated fasting BG will start on NPH at night today.  Growth US AGA today with normal amniotic fluid.    Bipolar  We reviewed the implications of treatment for bipolar in pregnancy and that generally the benefits of treated depression outweigh potential fetal risks associated with medication use.  We discussed that untreated mood disorders in pregnancy carries with it risks of poor compliance with prenatal care, poor nutrition, low birth weight,  fetal growth, and disruptions in mother-infant bonding.  I did review that there are a number of medications with a favorable safety profile in pregnancy including antiepileptic medications frequently used in the treatment of bipolar.  I reviewed the reassuring data supporting the use of lexapro.  I noted that cariprazine has minimal pregnancy data regarding its use.   However, the risk of suboptimal management must be considered and I did note that I would support the use of her current regimen, particularly as she is doing well and has failed several other medications.  Finally I did discuss the potential for worsening symptoms postpartum including the small risk of postpartum psychosis and the need for close follow up during pregnancy and during the postpartum  period.      In summary the following is recommended:  We will continue to manage diabetes via the Bloodsugar line with weekly assessment of glycemic control  Will start on insulin 10 units daily in evening. Diabetes education performed today. Referral to nutrition made today.  Recommend serial growth ultrasounds, next scheduled with follow up in 4-6 weeks   testing: weekly at 32 weeks  Delivery is recommended at 39 weeks, though if glycemic control is suboptimal then delivery at 37-39 weeks may be considered.  If the EFW is >4500g at the time of delivery  should be considered.  A 2hr GTT is recommended 6 weeks postpartum (can be performed as soon as 2 days postpartum), if normal then screening for T2DM is recommended at least every 3 years.    Thank you for allowing us to participate in the care of your patient. We anticipate she should be able to continue her general care under your supervision and we will continue to follow her to manage her GDM. Please do not hesitate to contact us with any questions.     Seen and discussed w/Dr. Axel Benitez MD  PGY-1, Obstetrics and Gynecology     Maternal Fetal Medicine      Attestation with edits by Andre Reyna MD at 2024  6:35 PM:  I saw and evaluated the patient. I personally obtained the key and critical portions of the history and physical exam or was physically present for key and critical portions performed by the resident/fellow. I reviewed the resident/fellow's documentation and discussed the patient with the resident/fellow. I agree with the resident/fellow's medical decision making as documented in the note.    I spent 60 minutes in the professional and overall care of this patient.    Andre Reyna MD  Maternal Fetal Medicine'

## 2024-07-23 NOTE — PROGRESS NOTES
Insulin Pen Education   Hortensia Castillo here with mother  Team recommended NPH insulin 10 At Bedtime      Able to demonstrate/describe:     Wash hands      Check label - verify correct medication   Gentle mix (NPH)   Attach new needle each time   Safety test (prime pen, ensure needle working properly)    Select correct dose   Self injection - R abd, no medication   Site selection & rotation   Remove needle   Insulin Storage   Needle disposal    Review Hypoglycemia    Causes   Sign & symptoms   Oral Treatment    Patient appears to have good understanding and is engaged in self-care.  Encouraged to call for questions or concerns    Given printed education materials  Plans f/u by phone 3-5 days and then weekly for BGM support.    Has log sheets and/or contact information.

## 2024-07-23 NOTE — PROGRESS NOTES
2024   Hortensia Castillo     MFM CONSULT NOTE  Referring provider: Mariela Wasserman at [facility]  Reason for referral: GDM    HPI: Hortensia Castillo is a 24 y.o.  at Unknown here for consult for GDM. Denies contractions, bleeding, or LOF. Reports normal fetal movement    Patient reports that she has been doing okay with adjusting to checking her sugars everyday. It's a big learning curve but she has her BG log with her today. Meal times BF ~7-9 am, lunch 11-1 pm, dinner 4-6 pm. Snacks during the day (triscuits, cheese, fruit). Has talked to Dalia over the phone. Reports that she has mac and cheese and ice cream around 10 pm - midnight some days. Checks fasting 7-9 am, and rest of her BS one hour after meals.     BS log: Fastin - 193,  -168, lunch 89 -190, dinner 107 - 473    Other history as noted below, reports that she does have anxiety/depression and mild BPD that she currently takes medications for and is engaged in treatment with psychologists. Does report some daily vaping and has used marijuana once during this pregnancy. Has an anatomy US scheduled for later today. Planning on an open adoption and will be breastfeeding initially a little bit.     Pregnancy also notable for:  Anemia (on PO iron)     Other pregnancy complications include   Patient Active Problem List   Diagnosis    Bipolar disorder in partial remission (Multi)    Anxiety    Gestational diabetes mellitus (GDM), antepartum ([facility])    30 weeks gestation of pregnancy ([facility])         10 point review of system is negative except as above    OB History          1    Para        Term                AB        Living             SAB        IAB        Ectopic        Multiple        Live Births                   GYN hx:   Denies any surgeries to uterus/vagina  Hx of trichomonas (2019)  Denies HSV    Past medical history: Denies HTN, DM, asthma or thyroid issues  - Depression, anxiety, mild BPD - engaged w/care with  psychologist    Surghx: wisdom teeth ~    Medications: ferrous sulfate 325 bid , vraylar 1.5 mg, Lexapro 5 mg, PNV    Allergies   Allergen Reactions    Abilify [Aripiprazole] Other     Tardive dyskinesia    Penicillin G Rash    Zyrtec [Cetirizine] Rash       Social History     Tobacco Use    Smoking status: Every Day     Current packs/day: 0.25     Types: Cigarettes    Smokeless tobacco: Never   Vaping Use    Vaping status: Every Day    Substances: Nicotine    Devices: Disposable   Substance Use Topics    Alcohol use: Never    Drug use: Never   Works as a  at [employer]    family history is not on file. She was adopted.      OBJECTIVE  Visit Vitals  OB Status Pregnant   Smoking Status Every Day       Physical exam  BP: 125/87  FHT: 139    ASSESSMENT & PLAN    Hortensia Castillo is a 24 y.o.  at 30w0d here for the following:    A2DM  Patient was recently diagnosed with gestational diabetes (GDM).  We discussed the pregnancy implications of the diagnosis including increased risk of pre-eclampsia, LGA/macrosomia, shoulder dystocia, stillbirth as well as  hypoglycemia, hyperbilirubinemia and respiratory distress.  We reviewed the importance of glycemic control and its impact on lowering these risks.    Discussed management ranging from dietary changes to pharmacotherapy and that insulin is considered first line therapy rather than oral agents if medication is ultimately needed.    Discussed our recommendation for serial growth ultrasounds and starting twice weekly  testing if medication treatment is needed.  We also stressed the potential persistence of diabetes post pregnancy and recommendations for screening post-partum and, if normal, routine screening every 3 years. We did discuss that a healthy diet and maintenance of a normal body weight may reduce those risks.  Blood sugars reviewed, notable for: POC 82 (cereal ~9am this AM)  - Fastin - 193 (13/15 abnormal),  -168 (  abnormal), lunch 89 -190 (3/10 abnormal), dinner 107 - 473 ( abormal - 473 likely erroneously high)  Current treatment plan: diet, though given elevated fasting BG will start on NPH at night today.  Growth US AGA today with normal amniotic fluid.    Bipolar  We reviewed the implications of treatment for bipolar in pregnancy and that generally the benefits of treated depression outweigh potential fetal risks associated with medication use.  We discussed that untreated mood disorders in pregnancy carries with it risks of poor compliance with prenatal care, poor nutrition, low birth weight,  fetal growth, and disruptions in mother-infant bonding.  I did review that there are a number of medications with a favorable safety profile in pregnancy including antiepileptic medications frequently used in the treatment of bipolar.  I reviewed the reassuring data supporting the use of lexapro.  I noted that cariprazine has minimal pregnancy data regarding its use.   However, the risk of suboptimal management must be considered and I did note that I would support the use of her current regimen, particularly as she is doing well and has failed several other medications.  Finally I did discuss the potential for worsening symptoms postpartum including the small risk of postpartum psychosis and the need for close follow up during pregnancy and during the postpartum period.      In summary the following is recommended:  We will continue to manage diabetes via the Bloodsugar line with weekly assessment of glycemic control  Will start on insulin 10 units daily in evening. Diabetes education performed today. Referral to nutrition made today.  Recommend serial growth ultrasounds, next scheduled with follow up in 4-6 weeks   testing: weekly at 32 weeks  Delivery is recommended at 39 weeks, though if glycemic control is suboptimal then delivery at 37-39 weeks may be considered.  If the EFW is >4500g at the time of  delivery  should be considered.  A 2hr GTT is recommended 6 weeks postpartum (can be performed as soon as 2 days postpartum), if normal then screening for T2DM is recommended at least every 3 years.    Thank you for allowing us to participate in the care of your patient. We anticipate she should be able to continue her general care under your supervision and we will continue to follow her to manage her GDM. Please do not hesitate to contact us with any questions.     Seen and discussed w/Dr. Axel Benitez MD  PGY-1, Obstetrics and Gynecology     Maternal Fetal Medicine

## 2024-07-29 ENCOUNTER — PATIENT MESSAGE (OUTPATIENT)
Dept: MATERNAL FETAL MEDICINE | Facility: CLINIC | Age: 24
End: 2024-07-29
Payer: COMMERCIAL

## 2024-08-05 ENCOUNTER — TELEPHONE (OUTPATIENT)
Dept: MATERNAL FETAL MEDICINE | Facility: HOSPITAL | Age: 24
End: 2024-08-05
Payer: COMMERCIAL

## 2024-08-05 NOTE — TELEPHONE ENCOUNTER
Blood Sugar Support Line Communication   Communicated with the patient on 8/5/2024   She has Gestational Diabetes 31w6d    At the time of the call her diabetes was treated with:  NPH insulin 16 At Bedtime      The patient checks her sugars and reports them as follows:      The patient's regimen was changed to:   NPH insulin ADD 8* at breakfast and change to 20* At Bedtime      Patient understands to submit sugar log for review weekly through the Blood Sugar Line @ [phone] or via email to [email or web address] to help optimize glucose control.    A voice mail message was left at the contact number provided by the patient.     Informed that this information is available in [facility] telephone note

## 2024-08-12 ENCOUNTER — PATIENT MESSAGE (OUTPATIENT)
Dept: MATERNAL FETAL MEDICINE | Facility: HOSPITAL | Age: 24
End: 2024-08-12
Payer: COMMERCIAL

## 2024-08-19 ENCOUNTER — PATIENT MESSAGE (OUTPATIENT)
Dept: MATERNAL FETAL MEDICINE | Facility: HOSPITAL | Age: 24
End: 2024-08-19
Payer: COMMERCIAL

## 2024-08-26 ENCOUNTER — PATIENT MESSAGE (OUTPATIENT)
Dept: MATERNAL FETAL MEDICINE | Facility: HOSPITAL | Age: 24
End: 2024-08-26
Payer: COMMERCIAL

## 2024-08-26 PROBLEM — Z3A.35 35 WEEKS GESTATION OF PREGNANCY (HHS-HCC): Status: ACTIVE | Noted: 2024-07-23

## 2024-08-27 ENCOUNTER — HOSPITAL ENCOUNTER (OUTPATIENT)
Dept: RADIOLOGY | Facility: CLINIC | Age: 24
Discharge: HOME | End: 2024-08-27
Payer: COMMERCIAL

## 2024-08-27 ENCOUNTER — ROUTINE PRENATAL (OUTPATIENT)
Dept: MATERNAL FETAL MEDICINE | Facility: CLINIC | Age: 24
End: 2024-08-27
Payer: COMMERCIAL

## 2024-08-27 VITALS
WEIGHT: 188 LBS | DIASTOLIC BLOOD PRESSURE: 73 MMHG | SYSTOLIC BLOOD PRESSURE: 111 MMHG | BODY MASS INDEX: 35.52 KG/M2 | HEART RATE: 103 BPM

## 2024-08-27 DIAGNOSIS — Z3A.35 35 WEEKS GESTATION OF PREGNANCY (HHS-HCC): Primary | ICD-10-CM

## 2024-08-27 DIAGNOSIS — O24.419 GESTATIONAL DIABETES MELLITUS (GDM) IN THIRD TRIMESTER, GESTATIONAL DIABETES METHOD OF CONTROL UNSPECIFIED (HHS-HCC): ICD-10-CM

## 2024-08-27 DIAGNOSIS — O24.414 INSULIN CONTROLLED GESTATIONAL DIABETES MELLITUS (GDM) DURING PREGNANCY, ANTEPARTUM (HHS-HCC): ICD-10-CM

## 2024-08-27 LAB — GLUCOSE BLD MANUAL STRIP-MCNC: 82 MG/DL (ref 74–99)

## 2024-08-27 PROCEDURE — 76819 FETAL BIOPHYS PROFIL W/O NST: CPT

## 2024-08-27 PROCEDURE — 82947 ASSAY GLUCOSE BLOOD QUANT: CPT | Performed by: OBSTETRICS & GYNECOLOGY

## 2024-08-27 PROCEDURE — 76816 OB US FOLLOW-UP PER FETUS: CPT | Performed by: OBSTETRICS & GYNECOLOGY

## 2024-08-27 PROCEDURE — 99214 OFFICE O/P EST MOD 30 MIN: CPT | Performed by: OBSTETRICS & GYNECOLOGY

## 2024-08-27 PROCEDURE — 76816 OB US FOLLOW-UP PER FETUS: CPT

## 2024-08-27 PROCEDURE — 76819 FETAL BIOPHYS PROFIL W/O NST: CPT | Performed by: OBSTETRICS & GYNECOLOGY

## 2024-08-27 PROCEDURE — 99214 OFFICE O/P EST MOD 30 MIN: CPT | Mod: GC | Performed by: OBSTETRICS & GYNECOLOGY

## 2024-08-27 NOTE — PROGRESS NOTES
MFM Follow-up  2024       SUBJECTIVE    HPI: Hortensia Castillo is a 24 y.o.  at 35w0d here for RPNV. Denies contractions, bleeding, or LOF. Reports normal fetal movement. Patient reports intermittent LE swelling that improves when off her feet. She has started weekly  testing with her primary OB.     OBJECTIVE    Visit Vitals  /73   Pulse 103   Wt 85.3 kg (188 lb)   BMI 35.52 kg/m²   OB Status Pregnant   Smoking Status Every Day   BSA 1.92 m²      General Appearance: no acute distress  Lungs: normal work of breathing  Abdomen: gravid  Extremities: b/l non-pitting pedal edema  Neurologic: no gross deficits  Psych exam: normal mood and affect    FHT: refer to US performed today.  EFW 78%ile and normal amniotic fluid (high normal)    ASSESSMENT & PLAN    Hortensia Castillo is a 24 y.o.  at 35w0d here for the following concerns we addressed today:    Gestational diabetes mellitus (GDM), antepartum ([facility])  POCT  today: 82  Blood sugars reviewed:  Fastin-129 (3/7 abnormal)  Breakfast: 108-162 (2/8 abnormal)  Lunch: 115-176 (2/7 abnormal)  Dinner: 117-161 (3/7 abnormal)  Currently managed on: NPH     Pt with occasional blood sugars above range. Mostly postprandial after dinner and morning fasting. Otherwise overall well controlled.  Regimen changed to NPH   Cont  testing. Growth today.    35 weeks gestation of pregnancy ([facility])  Continue routine care w/ Mariela Wasserman at [facility]       Orders Placed This Encounter   Procedures    POCT GLUCOSE     Continue care with primary.     Patient seen and evaluated with Dr. Reyna.  Justina Tse MD  [facility], PGY-1

## 2024-08-27 NOTE — ASSESSMENT & PLAN NOTE
POCT  today: 82  Blood sugars reviewed:  Fastin-129 (3/7 abnormal)  Breakfast: 108-162 (2/8 abnormal)  Lunch: 115-176 (2/7 abnormal)  Dinner: 117-161 (3/7 abnormal)  Currently managed on: NPH     Pt with occasional blood sugars above range. Mostly postprandial after dinner and morning fasting. Otherwise overall well controlled.  Regimen changed to NPH   Cont  testing. Growth today. Fetus normally grown.  Recommend delivery @ 39.0 as growth has been normal and BS has been overall well controlled

## 2024-08-27 NOTE — LETTER
2024     Mariela Wasserman MD  [address]  [address]  [place] [address]    Patient: Hortensia Castillo   YOB: 2000   Date of Visit: 2024       Dear Dr. Mariela Wasserman MD:    Thank you for referring Hortensia Castillo to me for evaluation. Below are my notes for this consultation.  If you have questions, please do not hesitate to call me. I look forward to following your patient along with you.       Sincerely,     Andre Reyna MD      CC: No Recipients  ______________________________________________________________________________________    MFM Follow-up  2024       SUBJECTIVE    HPI: Hortensia Castillo is a 24 y.o.  at 35w0d here for RPNV. Denies contractions, bleeding, or LOF. Reports normal fetal movement. Patient reports intermittent LE swelling that improves when off her feet. She has started weekly  testing with her primary OB.     OBJECTIVE    Visit Vitals  /73   Pulse 103   Wt 85.3 kg (188 lb)   BMI 35.52 kg/m²   OB Status Pregnant   Smoking Status Every Day   BSA 1.92 m²      General Appearance: no acute distress  Lungs: normal work of breathing  Abdomen: gravid  Extremities: b/l non-pitting pedal edema  Neurologic: no gross deficits  Psych exam: normal mood and affect    FHT: refer to US performed today.  EFW 78%ile and normal amniotic fluid (high normal)    ASSESSMENT & PLAN    Hortensia Castillo is a 24 y.o.  at 35w0d here for the following concerns we addressed today:    Gestational diabetes mellitus (GDM), antepartum ([facility])  POCT  today: 82  Blood sugars reviewed:  Fastin-129 (3/7 abnormal)  Breakfast: 108-162 (2/8 abnormal)  Lunch: 115-176 (2/7 abnormal)  Dinner: 117-161 (3/7 abnormal)  Currently managed on: NPH     Pt with occasional blood sugars above range. Mostly postprandial after dinner and morning fasting. Otherwise overall well controlled.  Regimen changed to NPH   Cont  testing. Growth today.    35 weeks gestation of pregnancy  ([facility])  Continue routine care w/ Mariela Wasserman at [facility]       Orders Placed This Encounter   Procedures   • POCT GLUCOSE     Continue care with primary.     Patient seen and evaluated with Dr. Reyna.  Justina Tse MD  GYN, PGY-1     Attestation with edits by Andre Reyna MD at 2024  2:27 PM:  I saw and evaluated the patient. I personally obtained the key and critical portions of the history and physical exam or was physically present for key and critical portions performed by the resident/fellow. I reviewed the resident/fellow's documentation and discussed the patient with the resident/fellow. I agree with the resident/fellow's medical decision making as documented in the note.    In summary the following is recommended:    1. We will continue to co-manage diabetes via the Bloodsugar line with weekly assessment of glycemic control.  Current regimen is: NPH   2. Weekly  testing is recommended to be increased to twice weekly at 36 weeks.  5. Delivery is recommended at 39 weeks in the absence of other complicating factors  6. If the EFW is >4500g at the time of delivery  should be considered.  7. A 2hr GTT is recommended 6 weeks postpartum (can be performed as soon as 2 days postpartum), if normal then screening for T2DM is recommended at least every 3 years.    Andre Reyna MD

## 2024-08-27 NOTE — LETTER
8/27/2024    To Whom It May Concern:    Hortensia Castillo is being followed for her pregnancy at [facility].  Estimated Date of Delivery: 10/1/24    Sincerely,        Andre Reyna MD  [facility]

## 2024-09-03 ENCOUNTER — PATIENT MESSAGE (OUTPATIENT)
Dept: MATERNAL FETAL MEDICINE | Facility: HOSPITAL | Age: 24
End: 2024-09-03
Payer: COMMERCIAL

## 2024-09-04 PROCEDURE — 87081 CULTURE SCREEN ONLY: CPT

## 2024-09-05 ENCOUNTER — LAB REQUISITION (OUTPATIENT)
Dept: LAB | Facility: HOSPITAL | Age: 24
End: 2024-09-05
Payer: COMMERCIAL

## 2024-09-05 DIAGNOSIS — O24.414 GESTATIONAL DIABETES MELLITUS IN PREGNANCY, INSULIN CONTROLLED (HHS-HCC): ICD-10-CM

## 2024-09-07 LAB — GP B STREP GENITAL QL CULT: NORMAL

## 2024-09-09 ENCOUNTER — PATIENT MESSAGE (OUTPATIENT)
Dept: MATERNAL FETAL MEDICINE | Facility: CLINIC | Age: 24
End: 2024-09-09
Payer: COMMERCIAL

## 2024-09-11 ENCOUNTER — PATIENT MESSAGE (OUTPATIENT)
Dept: MATERNAL FETAL MEDICINE | Facility: CLINIC | Age: 24
End: 2024-09-11
Payer: COMMERCIAL

## 2024-09-11 NOTE — PATIENT COMMUNICATION
Verbally reported blood sugars since increasing morning dose yesterday (no increase n bedtime dose)    9/10  111  131  166  156  9/11  109  117    No overnight snacking, no indications that she is going low overnight, although did not check any sugars overnight.

## 2024-09-16 ENCOUNTER — PATIENT MESSAGE (OUTPATIENT)
Dept: MATERNAL FETAL MEDICINE | Facility: CLINIC | Age: 24
End: 2024-09-16
Payer: COMMERCIAL

## 2024-09-16 DIAGNOSIS — O24.414 INSULIN CONTROLLED GESTATIONAL DIABETES MELLITUS (GDM) DURING PREGNANCY, ANTEPARTUM (HHS-HCC): ICD-10-CM

## 2024-09-16 RX ORDER — BLOOD SUGAR DIAGNOSTIC
STRIP MISCELLANEOUS
Qty: 100 EACH | Refills: 2 | Status: SHIPPED | OUTPATIENT
Start: 2024-09-16

## 2024-09-23 ENCOUNTER — PATIENT MESSAGE (OUTPATIENT)
Dept: MATERNAL FETAL MEDICINE | Facility: CLINIC | Age: 24
End: 2024-09-23
Payer: COMMERCIAL

## 2024-09-24 ENCOUNTER — HOSPITAL ENCOUNTER (INPATIENT)
Facility: HOSPITAL | Age: 24
LOS: 4 days | Discharge: HOME | End: 2024-09-28
Attending: STUDENT IN AN ORGANIZED HEALTH CARE EDUCATION/TRAINING PROGRAM | Admitting: STUDENT IN AN ORGANIZED HEALTH CARE EDUCATION/TRAINING PROGRAM
Payer: COMMERCIAL

## 2024-09-24 ENCOUNTER — ANESTHESIA EVENT (OUTPATIENT)
Dept: OBSTETRICS AND GYNECOLOGY | Facility: HOSPITAL | Age: 24
End: 2024-09-24
Payer: COMMERCIAL

## 2024-09-24 ENCOUNTER — ANESTHESIA (OUTPATIENT)
Dept: OBSTETRICS AND GYNECOLOGY | Facility: HOSPITAL | Age: 24
End: 2024-09-24
Payer: COMMERCIAL

## 2024-09-24 ENCOUNTER — APPOINTMENT (OUTPATIENT)
Dept: OBSTETRICS AND GYNECOLOGY | Facility: HOSPITAL | Age: 24
End: 2024-09-24
Payer: COMMERCIAL

## 2024-09-24 DIAGNOSIS — Z34.90 TERM PREGNANCY (HHS-HCC): ICD-10-CM

## 2024-09-24 DIAGNOSIS — G89.18 POST-OP PAIN: ICD-10-CM

## 2024-09-24 DIAGNOSIS — O61.0: Primary | ICD-10-CM

## 2024-09-24 LAB
ABO GROUP (TYPE) IN BLOOD: NORMAL
ANTIBODY SCREEN: NORMAL
ERYTHROCYTE [DISTWIDTH] IN BLOOD BY AUTOMATED COUNT: 13.4 % (ref 11.5–14.5)
GLUCOSE BLD MANUAL STRIP-MCNC: 111 MG/DL (ref 74–99)
HCT VFR BLD AUTO: 38.1 % (ref 36–46)
HGB BLD-MCNC: 12.5 G/DL (ref 12–16)
MCH RBC QN AUTO: 28.3 PG (ref 26–34)
MCHC RBC AUTO-ENTMCNC: 32.8 G/DL (ref 32–36)
MCV RBC AUTO: 86 FL (ref 80–100)
NRBC BLD-RTO: 0 /100 WBCS (ref 0–0)
PLATELET # BLD AUTO: 182 X10*3/UL (ref 150–450)
RBC # BLD AUTO: 4.42 X10*6/UL (ref 4–5.2)
RH FACTOR (ANTIGEN D): NORMAL
WBC # BLD AUTO: 9.9 X10*3/UL (ref 4.4–11.3)

## 2024-09-24 PROCEDURE — 86901 BLOOD TYPING SEROLOGIC RH(D): CPT | Performed by: STUDENT IN AN ORGANIZED HEALTH CARE EDUCATION/TRAINING PROGRAM

## 2024-09-24 PROCEDURE — 85027 COMPLETE CBC AUTOMATED: CPT | Performed by: STUDENT IN AN ORGANIZED HEALTH CARE EDUCATION/TRAINING PROGRAM

## 2024-09-24 PROCEDURE — 86780 TREPONEMA PALLIDUM: CPT | Mod: TRILAB,WESLAB | Performed by: STUDENT IN AN ORGANIZED HEALTH CARE EDUCATION/TRAINING PROGRAM

## 2024-09-24 PROCEDURE — 2500000002 HC RX 250 W HCPCS SELF ADMINISTERED DRUGS (ALT 637 FOR MEDICARE OP, ALT 636 FOR OP/ED): Performed by: STUDENT IN AN ORGANIZED HEALTH CARE EDUCATION/TRAINING PROGRAM

## 2024-09-24 PROCEDURE — 82947 ASSAY GLUCOSE BLOOD QUANT: CPT

## 2024-09-24 PROCEDURE — 1220000001 HC OB SEMI-PRIVATE ROOM DAILY

## 2024-09-24 PROCEDURE — 36415 COLL VENOUS BLD VENIPUNCTURE: CPT | Performed by: STUDENT IN AN ORGANIZED HEALTH CARE EDUCATION/TRAINING PROGRAM

## 2024-09-24 RX ORDER — DEXTROSE 40 %
15 GEL (GRAM) ORAL
Status: DISCONTINUED | OUTPATIENT
Start: 2024-09-24 | End: 2024-09-28 | Stop reason: HOSPADM

## 2024-09-24 RX ORDER — FENTANYL/ROPIVACAINE/NS/PF 2MCG/ML-.2
0-25 PLASTIC BAG, INJECTION (ML) INJECTION CONTINUOUS
Status: DISCONTINUED | OUTPATIENT
Start: 2024-09-24 | End: 2024-09-28 | Stop reason: HOSPADM

## 2024-09-24 RX ORDER — MISOPROSTOL 200 UG/1
800 TABLET ORAL ONCE AS NEEDED
Status: DISCONTINUED | OUTPATIENT
Start: 2024-09-24 | End: 2024-09-28 | Stop reason: HOSPADM

## 2024-09-24 RX ORDER — HYDRALAZINE HYDROCHLORIDE 20 MG/ML
5 INJECTION INTRAMUSCULAR; INTRAVENOUS ONCE AS NEEDED
Status: DISCONTINUED | OUTPATIENT
Start: 2024-09-24 | End: 2024-09-28 | Stop reason: HOSPADM

## 2024-09-24 RX ORDER — TERBUTALINE SULFATE 1 MG/ML
0.25 INJECTION SUBCUTANEOUS ONCE AS NEEDED
Status: DISCONTINUED | OUTPATIENT
Start: 2024-09-24 | End: 2024-09-28 | Stop reason: HOSPADM

## 2024-09-24 RX ORDER — OXYTOCIN 10 [USP'U]/ML
10 INJECTION, SOLUTION INTRAMUSCULAR; INTRAVENOUS ONCE AS NEEDED
Status: DISCONTINUED | OUTPATIENT
Start: 2024-09-24 | End: 2024-09-28 | Stop reason: HOSPADM

## 2024-09-24 RX ORDER — LIDOCAINE HYDROCHLORIDE 10 MG/ML
30 INJECTION, SOLUTION INFILTRATION; PERINEURAL ONCE AS NEEDED
Status: DISCONTINUED | OUTPATIENT
Start: 2024-09-24 | End: 2024-09-28 | Stop reason: HOSPADM

## 2024-09-24 RX ORDER — DEXTROSE 40 %
30 GEL (GRAM) ORAL
Status: DISCONTINUED | OUTPATIENT
Start: 2024-09-24 | End: 2024-09-28 | Stop reason: HOSPADM

## 2024-09-24 RX ORDER — DEXTROSE 50 % IN WATER (D50W) INTRAVENOUS SYRINGE
25
Status: DISCONTINUED | OUTPATIENT
Start: 2024-09-24 | End: 2024-09-28 | Stop reason: HOSPADM

## 2024-09-24 RX ORDER — ONDANSETRON HYDROCHLORIDE 2 MG/ML
4 INJECTION, SOLUTION INTRAVENOUS EVERY 6 HOURS PRN
Status: DISCONTINUED | OUTPATIENT
Start: 2024-09-24 | End: 2024-09-28 | Stop reason: HOSPADM

## 2024-09-24 RX ORDER — OXYTOCIN/0.9 % SODIUM CHLORIDE 30/500 ML
60 PLASTIC BAG, INJECTION (ML) INTRAVENOUS ONCE AS NEEDED
Status: COMPLETED | OUTPATIENT
Start: 2024-09-24 | End: 2024-09-26

## 2024-09-24 RX ORDER — MISOPROSTOL 100 UG/1
25 TABLET ORAL
Status: COMPLETED | OUTPATIENT
Start: 2024-09-24 | End: 2024-09-25

## 2024-09-24 RX ORDER — TRANEXAMIC ACID 10 MG/ML
1000 INJECTION, SOLUTION INTRAVENOUS ONCE AS NEEDED
Status: ACTIVE | OUTPATIENT
Start: 2024-09-24 | End: 2024-09-27

## 2024-09-24 RX ORDER — METHYLERGONOVINE MALEATE 0.2 MG/ML
0.2 INJECTION INTRAVENOUS ONCE AS NEEDED
Status: DISCONTINUED | OUTPATIENT
Start: 2024-09-24 | End: 2024-09-28 | Stop reason: HOSPADM

## 2024-09-24 RX ORDER — ESCITALOPRAM OXALATE 10 MG/1
10 TABLET ORAL DAILY
Status: DISCONTINUED | OUTPATIENT
Start: 2024-09-25 | End: 2024-09-28 | Stop reason: HOSPADM

## 2024-09-24 RX ORDER — NIFEDIPINE 10 MG/1
10 CAPSULE ORAL ONCE AS NEEDED
Status: DISCONTINUED | OUTPATIENT
Start: 2024-09-24 | End: 2024-09-28 | Stop reason: HOSPADM

## 2024-09-24 RX ORDER — LOPERAMIDE HYDROCHLORIDE 2 MG/1
4 CAPSULE ORAL EVERY 2 HOUR PRN
Status: DISCONTINUED | OUTPATIENT
Start: 2024-09-24 | End: 2024-09-28 | Stop reason: HOSPADM

## 2024-09-24 RX ORDER — METOCLOPRAMIDE HYDROCHLORIDE 5 MG/ML
10 INJECTION INTRAMUSCULAR; INTRAVENOUS EVERY 6 HOURS PRN
Status: DISCONTINUED | OUTPATIENT
Start: 2024-09-24 | End: 2024-09-24

## 2024-09-24 RX ORDER — SODIUM CHLORIDE, SODIUM LACTATE, POTASSIUM CHLORIDE, CALCIUM CHLORIDE 600; 310; 30; 20 MG/100ML; MG/100ML; MG/100ML; MG/100ML
125 INJECTION, SOLUTION INTRAVENOUS CONTINUOUS
Status: DISCONTINUED | OUTPATIENT
Start: 2024-09-24 | End: 2024-09-28 | Stop reason: HOSPADM

## 2024-09-24 RX ORDER — ONDANSETRON 4 MG/1
4 TABLET, FILM COATED ORAL EVERY 6 HOURS PRN
Status: DISCONTINUED | OUTPATIENT
Start: 2024-09-24 | End: 2024-09-28 | Stop reason: HOSPADM

## 2024-09-24 RX ORDER — METOCLOPRAMIDE 10 MG/1
10 TABLET ORAL EVERY 6 HOURS PRN
Status: DISCONTINUED | OUTPATIENT
Start: 2024-09-24 | End: 2024-09-24

## 2024-09-24 RX ORDER — ESCITALOPRAM OXALATE 10 MG/1
10 TABLET ORAL DAILY
Status: DISCONTINUED | OUTPATIENT
Start: 2024-09-25 | End: 2024-09-24

## 2024-09-24 RX ORDER — LABETALOL HYDROCHLORIDE 5 MG/ML
20 INJECTION, SOLUTION INTRAVENOUS ONCE AS NEEDED
Status: DISCONTINUED | OUTPATIENT
Start: 2024-09-24 | End: 2024-09-28 | Stop reason: HOSPADM

## 2024-09-24 RX ORDER — CARBOPROST TROMETHAMINE 250 UG/ML
250 INJECTION, SOLUTION INTRAMUSCULAR ONCE AS NEEDED
Status: DISCONTINUED | OUTPATIENT
Start: 2024-09-24 | End: 2024-09-28 | Stop reason: HOSPADM

## 2024-09-24 RX ADMIN — MISOPROSTOL 25 MCG: 100 TABLET ORAL at 22:55

## 2024-09-24 RX ADMIN — INSULIN HUMAN 48 UNITS: 100 INJECTION, SUSPENSION SUBCUTANEOUS at 22:23

## 2024-09-24 SDOH — HEALTH STABILITY: MENTAL HEALTH: HAVE YOU USED ANY PRESCRIPTION DRUGS OTHER THAN PRESCRIBED IN THE PAST 12 MONTHS?: NO

## 2024-09-24 SDOH — HEALTH STABILITY: MENTAL HEALTH: WERE YOU ABLE TO COMPLETE ALL THE BEHAVIORAL HEALTH SCREENINGS?: YES

## 2024-09-24 SDOH — SOCIAL STABILITY: SOCIAL INSECURITY: PHYSICAL ABUSE: DENIES

## 2024-09-24 SDOH — SOCIAL STABILITY: SOCIAL INSECURITY: ABUSE SCREEN: ADULT

## 2024-09-24 SDOH — HEALTH STABILITY: MENTAL HEALTH: NON-SPECIFIC ACTIVE SUICIDAL THOUGHTS (PAST 1 MONTH): NO

## 2024-09-24 SDOH — SOCIAL STABILITY: SOCIAL INSECURITY: HAS ANYONE EVER THREATENED TO HURT YOUR FAMILY OR YOUR PETS?: NO

## 2024-09-24 SDOH — SOCIAL STABILITY: SOCIAL INSECURITY: HAVE YOU HAD THOUGHTS OF HARMING ANYONE ELSE?: NO

## 2024-09-24 SDOH — SOCIAL STABILITY: SOCIAL INSECURITY: ARE YOU OR HAVE YOU BEEN THREATENED OR ABUSED PHYSICALLY, EMOTIONALLY, OR SEXUALLY BY ANYONE?: NO

## 2024-09-24 SDOH — HEALTH STABILITY: MENTAL HEALTH: WISH TO BE DEAD (PAST 1 MONTH): NO

## 2024-09-24 SDOH — HEALTH STABILITY: MENTAL HEALTH: HAVE YOU USED ANY SUBSTANCES (CANABIS, COCAINE, HEROIN, HALLUCINOGENS, INHALANTS, ETC.) IN THE PAST 12 MONTHS?: YES

## 2024-09-24 SDOH — SOCIAL STABILITY: SOCIAL INSECURITY: DOES ANYONE TRY TO KEEP YOU FROM HAVING/CONTACTING OTHER FRIENDS OR DOING THINGS OUTSIDE YOUR HOME?: NO

## 2024-09-24 SDOH — SOCIAL STABILITY: SOCIAL INSECURITY: VERBAL ABUSE: DENIES

## 2024-09-24 SDOH — ECONOMIC STABILITY: HOUSING INSECURITY: DO YOU FEEL UNSAFE GOING BACK TO THE PLACE WHERE YOU ARE LIVING?: NO

## 2024-09-24 SDOH — HEALTH STABILITY: MENTAL HEALTH: SUICIDAL BEHAVIOR (LIFETIME): NO

## 2024-09-24 SDOH — SOCIAL STABILITY: SOCIAL INSECURITY: HAVE YOU HAD ANY THOUGHTS OF HARMING ANYONE ELSE?: NO

## 2024-09-24 SDOH — SOCIAL STABILITY: SOCIAL INSECURITY: ARE THERE ANY APPARENT SIGNS OF INJURIES/BEHAVIORS THAT COULD BE RELATED TO ABUSE/NEGLECT?: NO

## 2024-09-24 SDOH — HEALTH STABILITY: MENTAL HEALTH: CURRENT SMOKER: 1

## 2024-09-24 SDOH — SOCIAL STABILITY: SOCIAL INSECURITY: DO YOU FEEL ANYONE HAS EXPLOITED OR TAKEN ADVANTAGE OF YOU FINANCIALLY OR OF YOUR PERSONAL PROPERTY?: NO

## 2024-09-24 ASSESSMENT — PAIN SCALES - GENERAL
PAINLEVEL_OUTOF10: 0 - NO PAIN
PAINLEVEL_OUTOF10: 0 - NO PAIN

## 2024-09-24 ASSESSMENT — ACTIVITIES OF DAILY LIVING (ADL)
PATIENT'S MEMORY ADEQUATE TO SAFELY COMPLETE DAILY ACTIVITIES?: YES
BATHING: INDEPENDENT
DRESSING YOURSELF: INDEPENDENT
FEEDING YOURSELF: INDEPENDENT
GROOMING: INDEPENDENT
WALKS IN HOME: INDEPENDENT
JUDGMENT_ADEQUATE_SAFELY_COMPLETE_DAILY_ACTIVITIES: YES
HEARING - RIGHT EAR: FUNCTIONAL
LACK_OF_TRANSPORTATION: NO
ADEQUATE_TO_COMPLETE_ADL: YES
HEARING - LEFT EAR: FUNCTIONAL
TOILETING: INDEPENDENT

## 2024-09-24 ASSESSMENT — LIFESTYLE VARIABLES
HOW MANY STANDARD DRINKS CONTAINING ALCOHOL DO YOU HAVE ON A TYPICAL DAY: PATIENT DOES NOT DRINK
AUDIT-C TOTAL SCORE: 0
HOW OFTEN DO YOU HAVE A DRINK CONTAINING ALCOHOL: NEVER
HOW OFTEN DO YOU HAVE 6 OR MORE DRINKS ON ONE OCCASION: NEVER
AUDIT-C TOTAL SCORE: 0
SKIP TO QUESTIONS 9-10: 1

## 2024-09-24 ASSESSMENT — PATIENT HEALTH QUESTIONNAIRE - PHQ9
SUM OF ALL RESPONSES TO PHQ9 QUESTIONS 1 & 2: 0
2. FEELING DOWN, DEPRESSED OR HOPELESS: NOT AT ALL
1. LITTLE INTEREST OR PLEASURE IN DOING THINGS: NOT AT ALL

## 2024-09-25 LAB
ABO GROUP (TYPE) IN BLOOD: NORMAL
GLUCOSE BLD MANUAL STRIP-MCNC: 63 MG/DL (ref 74–99)
GLUCOSE BLD MANUAL STRIP-MCNC: 70 MG/DL (ref 74–99)
GLUCOSE BLD MANUAL STRIP-MCNC: 75 MG/DL (ref 74–99)
GLUCOSE BLD MANUAL STRIP-MCNC: 78 MG/DL (ref 74–99)
GLUCOSE BLD MANUAL STRIP-MCNC: 79 MG/DL (ref 74–99)
GLUCOSE BLD MANUAL STRIP-MCNC: 80 MG/DL (ref 74–99)
GLUCOSE BLD MANUAL STRIP-MCNC: 96 MG/DL (ref 74–99)
RH FACTOR (ANTIGEN D): NORMAL
TREPONEMA PALLIDUM IGG+IGM AB [PRESENCE] IN SERUM OR PLASMA BY IMMUNOASSAY: NONREACTIVE

## 2024-09-25 PROCEDURE — 2500000004 HC RX 250 GENERAL PHARMACY W/ HCPCS (ALT 636 FOR OP/ED): Performed by: STUDENT IN AN ORGANIZED HEALTH CARE EDUCATION/TRAINING PROGRAM

## 2024-09-25 PROCEDURE — 1220000001 HC OB SEMI-PRIVATE ROOM DAILY

## 2024-09-25 PROCEDURE — 3E033VJ INTRODUCTION OF OTHER HORMONE INTO PERIPHERAL VEIN, PERCUTANEOUS APPROACH: ICD-10-PCS | Performed by: STUDENT IN AN ORGANIZED HEALTH CARE EDUCATION/TRAINING PROGRAM

## 2024-09-25 PROCEDURE — 82947 ASSAY GLUCOSE BLOOD QUANT: CPT

## 2024-09-25 PROCEDURE — 2500000001 HC RX 250 WO HCPCS SELF ADMINISTERED DRUGS (ALT 637 FOR MEDICARE OP): Performed by: STUDENT IN AN ORGANIZED HEALTH CARE EDUCATION/TRAINING PROGRAM

## 2024-09-25 PROCEDURE — 7210000002 HC LABOR PER HOUR

## 2024-09-25 PROCEDURE — 3E0DXGC INTRODUCTION OF OTHER THERAPEUTIC SUBSTANCE INTO MOUTH AND PHARYNX, EXTERNAL APPROACH: ICD-10-PCS | Performed by: STUDENT IN AN ORGANIZED HEALTH CARE EDUCATION/TRAINING PROGRAM

## 2024-09-25 PROCEDURE — 2500000002 HC RX 250 W HCPCS SELF ADMINISTERED DRUGS (ALT 637 FOR MEDICARE OP, ALT 636 FOR OP/ED): Performed by: STUDENT IN AN ORGANIZED HEALTH CARE EDUCATION/TRAINING PROGRAM

## 2024-09-25 PROCEDURE — 10907ZC DRAINAGE OF AMNIOTIC FLUID, THERAPEUTIC FROM PRODUCTS OF CONCEPTION, VIA NATURAL OR ARTIFICIAL OPENING: ICD-10-PCS | Performed by: STUDENT IN AN ORGANIZED HEALTH CARE EDUCATION/TRAINING PROGRAM

## 2024-09-25 RX ORDER — OXYTOCIN/0.9 % SODIUM CHLORIDE 30/500 ML
2-30 PLASTIC BAG, INJECTION (ML) INTRAVENOUS CONTINUOUS
Status: DISCONTINUED | OUTPATIENT
Start: 2024-09-25 | End: 2024-09-28 | Stop reason: HOSPADM

## 2024-09-25 RX ADMIN — MISOPROSTOL 25 MCG: 100 TABLET ORAL at 10:21

## 2024-09-25 RX ADMIN — MISOPROSTOL 25 MCG: 100 TABLET ORAL at 03:30

## 2024-09-25 RX ADMIN — CARIPRAZINE 1.5 MG: 1.5 CAPSULE, GELATIN COATED ORAL at 19:52

## 2024-09-25 RX ADMIN — MISOPROSTOL 25 MCG: 100 TABLET ORAL at 01:20

## 2024-09-25 RX ADMIN — ESCITALOPRAM OXALATE 10 MG: 10 TABLET ORAL at 19:52

## 2024-09-25 RX ADMIN — SODIUM CHLORIDE, SODIUM LACTATE, POTASSIUM CHLORIDE, AND CALCIUM CHLORIDE 125 ML/HR: 600; 310; 30; 20 INJECTION, SOLUTION INTRAVENOUS at 05:11

## 2024-09-25 RX ADMIN — SODIUM CHLORIDE, SODIUM LACTATE, POTASSIUM CHLORIDE, AND CALCIUM CHLORIDE 125 ML/HR: 600; 310; 30; 20 INJECTION, SOLUTION INTRAVENOUS at 13:06

## 2024-09-25 RX ADMIN — MISOPROSTOL 25 MCG: 100 TABLET ORAL at 08:14

## 2024-09-25 RX ADMIN — SODIUM CHLORIDE, SODIUM LACTATE, POTASSIUM CHLORIDE, AND CALCIUM CHLORIDE 125 ML/HR: 600; 310; 30; 20 INJECTION, SOLUTION INTRAVENOUS at 00:25

## 2024-09-25 RX ADMIN — Medication 2 MILLI-UNITS/MIN: at 14:42

## 2024-09-25 RX ADMIN — SODIUM CHLORIDE, SODIUM LACTATE, POTASSIUM CHLORIDE, AND CALCIUM CHLORIDE 125 ML/HR: 600; 310; 30; 20 INJECTION, SOLUTION INTRAVENOUS at 20:50

## 2024-09-25 RX ADMIN — MISOPROSTOL 25 MCG: 100 TABLET ORAL at 05:53

## 2024-09-25 SDOH — SOCIAL STABILITY: SOCIAL INSECURITY
WITHIN THE LAST YEAR, HAVE TO BEEN RAPED OR FORCED TO HAVE ANY KIND OF SEXUAL ACTIVITY BY YOUR PARTNER OR EX-PARTNER?: NO

## 2024-09-25 SDOH — ECONOMIC STABILITY: FOOD INSECURITY: WITHIN THE PAST 12 MONTHS, YOU WORRIED THAT YOUR FOOD WOULD RUN OUT BEFORE YOU GOT MONEY TO BUY MORE.: NEVER TRUE

## 2024-09-25 SDOH — SOCIAL STABILITY: SOCIAL INSECURITY
WITHIN THE LAST YEAR, HAVE YOU BEEN KICKED, HIT, SLAPPED, OR OTHERWISE PHYSICALLY HURT BY YOUR PARTNER OR EX-PARTNER?: NO

## 2024-09-25 SDOH — ECONOMIC STABILITY: INCOME INSECURITY: HOW HARD IS IT FOR YOU TO PAY FOR THE VERY BASICS LIKE FOOD, HOUSING, MEDICAL CARE, AND HEATING?: NOT HARD AT ALL

## 2024-09-25 SDOH — SOCIAL STABILITY: SOCIAL INSECURITY: WITHIN THE LAST YEAR, HAVE YOU BEEN HUMILIATED OR EMOTIONALLY ABUSED IN OTHER WAYS BY YOUR PARTNER OR EX-PARTNER?: NO

## 2024-09-25 SDOH — ECONOMIC STABILITY: FOOD INSECURITY: WITHIN THE PAST 12 MONTHS, THE FOOD YOU BOUGHT JUST DIDN'T LAST AND YOU DIDN'T HAVE MONEY TO GET MORE.: NEVER TRUE

## 2024-09-25 SDOH — ECONOMIC STABILITY: TRANSPORTATION INSECURITY
IN THE PAST 12 MONTHS, HAS LACK OF TRANSPORTATION KEPT YOU FROM MEETINGS, WORK, OR FROM GETTING THINGS NEEDED FOR DAILY LIVING?: NO

## 2024-09-25 SDOH — SOCIAL STABILITY: SOCIAL INSECURITY: WITHIN THE LAST YEAR, HAVE YOU BEEN AFRAID OF YOUR PARTNER OR EX-PARTNER?: NO

## 2024-09-25 SDOH — ECONOMIC STABILITY: TRANSPORTATION INSECURITY
IN THE PAST 12 MONTHS, HAS THE LACK OF TRANSPORTATION KEPT YOU FROM MEDICAL APPOINTMENTS OR FROM GETTING MEDICATIONS?: NO

## 2024-09-25 ASSESSMENT — PAIN SCALES - GENERAL
PAINLEVEL_OUTOF10: 1
PAINLEVEL_OUTOF10: 0 - NO PAIN
PAINLEVEL_OUTOF10: 1
PAINLEVEL_OUTOF10: 1
PAINLEVEL_OUTOF10: 2
PAINLEVEL_OUTOF10: 1
PAINLEVEL_OUTOF10: 0 - NO PAIN
PAINLEVEL_OUTOF10: 2

## 2024-09-25 NOTE — ANESTHESIA PREPROCEDURE EVALUATION
Patient: Hortensia Castillo    Evaluation Method: In-person visit    Procedure Information    Date: 09/24/24  Procedure: Labor Consult         Relevant Problems   Anesthesia (within normal limits)      Cardiac (within normal limits)      Pulmonary (within normal limits)      Neuro   (+) Anxiety   (+) Bipolar disorder in partial remission (Multi)      GI (within normal limits)      /Renal (within normal limits)      Liver (within normal limits)      Endocrine   (+) Gestational diabetes mellitus (GDM), antepartum ([facility])      Hematology (within normal limits)      Musculoskeletal (within normal limits)      HEENT (within normal limits)      ID (within normal limits)      Skin (within normal limits)      GYN   (+) 35 weeks gestation of pregnancy ([facility])       Clinical information reviewed:   Tobacco  Allergies  Meds   Med Hx  Surg Hx   Fam Hx  Soc Hx        NPO Detail:  No data recorded     OB/Gyn Evaluation    Present Pregnancy    Patient is pregnant now.   Obstetric History                Physical Exam    Airway  Mallampati: II  TM distance: <3 FB  Neck ROM: full     Cardiovascular - normal exam     Dental - normal exam     Pulmonary - normal exam     Abdominal - normal exam             Anesthesia Plan    History of general anesthesia?: yes  History of complications of general anesthesia?: no    ASA 2     epidural     The patient is a current smoker.    Anesthetic plan and risks discussed with patient.  Use of blood products discussed with patient who consented to blood products.    Plan discussed with CRNA.

## 2024-09-25 NOTE — CARE PLAN
The patient's goals for the shift include      The clinical goals for the shift include Pain management

## 2024-09-25 NOTE — H&P
OB Admission H&P    Assessment/Plan    Hortensia Castillo is a 24 y.o.  at 39w0d who presents for Induction of Labor    Plan:    -Admit to L&D, consent signed in office  -T&S, CBC, and Syphilis  -BGTs q4h in latent labor  -Epidural at patient request  -Recheck as clinically indicated by maternal or fetal status  -Plan to initiate induction with PO cytotec    Fetal Status  -NST reactive, reassuring   -Presentation vtx based on vaginal exam  -EFW 78%ile by 35wk US  -GBS negative      Subjective   Patient presents for scheduled induction of labor. Denies ctx, LOF, VB. +fetal movement. Pregnancy complicated by GDMA2 on insulin, current NPH dose , and bipolar disorder.     OB History    Para Term  AB Living   1 0 0 0 0 0   SAB IAB Ectopic Multiple Live Births   0 0 0 0 0      # Outcome Date GA Lbr Stu/2nd Weight Sex Type Anes PTL Lv   1 Current                History reviewed. No pertinent surgical history.    Social History     Tobacco Use    Smoking status: Every Day     Current packs/day: 0.25     Types: Cigarettes    Smokeless tobacco: Never   Substance Use Topics    Alcohol use: Never       Allergies   Allergen Reactions    Abilify [Aripiprazole] Other     Tardive dyskinesia    Penicillin G Rash    Zyrtec [Cetirizine] Rash       Medications Prior to Admission   Medication Sig Dispense Refill Last Dose    Vraylar 1.5 mg capsule Take 1 capsule (1.5 mg) by mouth once daily in the morning.   2024    alcohol swabs pads, medicated Use 1, up to 5 times a day 200 each 3     escitalopram (Lexapro) 5 mg tablet Take 1 tablet (5 mg) by mouth once daily in the morning. Take before meals.       insulin NPH, Isophane, (NovoLIN N FlexPen) 100 unit/mL (3 mL) injection Inject 10 units before bedtime. May increase to 50 units total per day during pregnancy as needed. 15 mL 3     pantoprazole (ProtoNix) 40 mg EC tablet Take 1 tablet (40 mg) by mouth once daily. Do not crush, chew, or split. 30 tablet 5     pen  "needle, diabetic (BD Ultra-Fine Micro Pen Needle) 32 gauge x 1/4\" needle Use 1 per injection BID during pregnancy 100 each 2      Objective     Last Vitals  Temp Pulse Resp BP MAP O2 Sat   37 °C (98.6 °F) 97 16 121/56 80 97 %     Blood Pressures         9/24/2024 2043 9/24/2024 2048 9/24/2024 2050       BP: 121/56 121/56 121/56              Physical Exam  General: NAD, mood appropriate  Cardiopulmonary: warm and well perfused, breathing comfortably on room air  Abdomen: Gravid, non-tender  Extremities: Symmetric  Speculum Exam: deferred  Cervix: cl/th/hi     Fetal Monitoring  FHT: Category 1  [place]: q10min    Labs in chart were reviewed.   Prenatal labs reviewed, not remarkable.  BGT on admission 111        "

## 2024-09-25 NOTE — PROGRESS NOTES
Social Work Brief Note     Patient: Hortensia Castillo        Reason for Visit: Adoption       History: Hortensia Castillo presented to [facility] on 24 for scheduled IOL of first child. Ms. Castillo has not yet delivered.     Ms. Castillo has identified plan for adoption through agency, [employer] [phone]. Case-worker is Azeb Mejia [phone].     Hospital has received signed authorization to release healthcare information to [employer] and this placed in Ms. Castillo's chart. Hospital has also received birth plan which has been placed into Ms. Castillo's chart.   Ms. Castillo has already identified adoptive parents, Ginger and Harry, who will be present at hospital to assist with providing care to  once delivered.       Assessment: Ms. Castillo is working with [employer] agency for adoption plan.      Plan: [employer] [facility] [employer] case-worker, Azeb Mejia, will present to hospital the day after delivery to confirm with Ms. Castillo continued plan for adoption and if so, to have her sign the agreement for temporary custody to agency and the adoptive parents to sign the ICCA (Individual  Agreement). With signed documentation, child will be allowed to be discharged to home with adoptive parents.    will remain available through admission as needed.       Signature:  YOUSIF Hopson

## 2024-09-25 NOTE — PROGRESS NOTES
Intrapartum Progress Note    Assessment/Plan   Hortensia Castillo is a 24 y.o.  at 39w1d. COREY: 10/1/2024, by Interfaced COREY.     FHR Cat I. GBS negative. Vital signs stable. S/p cytotec x 6  -Start pitocin  -continue POC glucose q 4 hour in early labor and then q 1-2 in active labor, attempt to maintain  in labor      Assessment & Plan  Term pregnancy ([facility])    Pregnancy Problems (from 24 to present)       Problem Noted Resolved    Term pregnancy ([facility]) 2024 by Maris Sandoval MD No    Priority:  Medium      35 weeks gestation of pregnancy ([facility]) 2024 by Rita Benitez MD No    Priority:  Medium      Overview Addendum 2024  9:42 AM by Justina Tse MD     Sees Mariela Wasserman at [facility] for routine PNC  Planning to place baby for adoption after delivery.                 Subjective   Pt evaluation for progress. Comfortable feeling contractions mildly      Objective   Last Vitals:  Temp Pulse Resp BP MAP Pulse Ox   36.6 °C (97.9 °F) 74 16 116/58 82 (!) 94 %     Vitals Min/Max Last 24 Hours:  Temp  Min: 36.5 °C (97.7 °F)  Max: 37 °C (98.6 °F)  Pulse  Min: 68  Max: 97  Resp  Min: 16  Max: 18  BP  Min: 103/55  Max: 125/68  MAP (mmHg)  Min: 68  Max: 86    Intake/Output:    Intake/Output Summary (Last 24 hours) at 2024 1421  Last data filed at 2024 0511  Gross per 24 hour   Intake 988 ml   Output --   Net 988 ml       Physical Examination:  GENERAL: Examination reveals a well developed, well nourished, gravid female in no acute distress. She is alert and cooperative.  FHR is 140  , with Accelerations, and a Category I tracing.    [place] reading:  irregular  CERVIX: 1 cm dilated, 50 % effaced, -2 station; MEMBRANES are Intact    Lab Review:  Lab Results   Component Value Date    WBC 9.9 2024    HGB 12.5 2024    HCT 38.1 2024     2024

## 2024-09-25 NOTE — CARE PLAN
The patient's goals for the shift include      The clinical goals for the shift include labor and delivery      Problem: Safety - Adult  Goal: Free from fall injury  Outcome: Progressing     Problem: Pain - Adult  Goal: Verbalizes/displays adequate comfort level or baseline comfort level  Outcome: Progressing     Problem: Postpartum  Goal: Minimal s/sx of HDP and BP<160/110  Outcome: Progressing     Problem: Postpartum  Goal: No s/sx of hemorrhage  Outcome: Progressing     Problem: Postpartum  Goal: No s/sx infection  Outcome: Progressing     Problem: Postpartum  Goal: No s/sx infection  Outcome: Progressing     Problem: Postpartum  Goal: Incisions, wounds, or drain sites healing without S/S of infection  Outcome: Progressing     Problem: Postpartum  Goal: Establish and maintain infant feeding pattern for adequate nutrition  Outcome: Progressing     Problem: Postpartum  Goal: Appropriate maternal -  bonding  Outcome: Progressing     Problem: Postpartum  Goal: Experiences normal postpartum course  Outcome: Progressing     Problem: Vaginal Birth or  Section  Goal: Fetal and maternal status remain reassuring during the birth process  Outcome: Progressing     Problem: Vaginal Birth or  Section  Goal: Tolerate CRB for IOL placement maintenance until dislodgement/removal 12hrs after placement  Outcome: Progressing     Problem: Vaginal Birth or  Section  Goal: Prevention of malpresentation/labor dystocia through delivery  Outcome: Progressing     Problem: Vaginal Birth or  Section  Goal: Demonstrates labor coping techniques through delivery  Outcome: Progressing

## 2024-09-26 ENCOUNTER — ANESTHESIA EVENT (OUTPATIENT)
Dept: OBSTETRICS AND GYNECOLOGY | Facility: HOSPITAL | Age: 24
End: 2024-09-26
Payer: COMMERCIAL

## 2024-09-26 ENCOUNTER — ANESTHESIA (OUTPATIENT)
Dept: OBSTETRICS AND GYNECOLOGY | Facility: HOSPITAL | Age: 24
End: 2024-09-26
Payer: COMMERCIAL

## 2024-09-26 PROBLEM — O61.0: Status: ACTIVE | Noted: 2024-09-24

## 2024-09-26 LAB
ABO GROUP (TYPE) IN BLOOD: NORMAL
ALBUMIN SERPL BCP-MCNC: 2.9 G/DL (ref 3.4–5)
ALP SERPL-CCNC: 81 U/L (ref 33–110)
ALT SERPL W P-5'-P-CCNC: 9 U/L (ref 7–45)
ANION GAP SERPL CALCULATED.3IONS-SCNC: 13 MMOL/L (ref 10–20)
ANTIBODY SCREEN: NORMAL
AST SERPL W P-5'-P-CCNC: 14 U/L (ref 9–39)
BILIRUB SERPL-MCNC: 0.8 MG/DL (ref 0–1.2)
BUN SERPL-MCNC: 10 MG/DL (ref 6–23)
CALCIUM SERPL-MCNC: 7.8 MG/DL (ref 8.6–10.3)
CHLORIDE SERPL-SCNC: 107 MMOL/L (ref 98–107)
CO2 SERPL-SCNC: 19 MMOL/L (ref 21–32)
CREAT SERPL-MCNC: 0.25 MG/DL (ref 0.5–1.05)
EGFRCR SERPLBLD CKD-EPI 2021: >90 ML/MIN/1.73M*2
ERYTHROCYTE [DISTWIDTH] IN BLOOD BY AUTOMATED COUNT: 13.3 % (ref 11.5–14.5)
EST. AVERAGE GLUCOSE BLD GHB EST-MCNC: 114 MG/DL
GLUCOSE BLD MANUAL STRIP-MCNC: 108 MG/DL (ref 74–99)
GLUCOSE BLD MANUAL STRIP-MCNC: 99 MG/DL (ref 74–99)
GLUCOSE BLD MANUAL STRIP-MCNC: 99 MG/DL (ref 74–99)
GLUCOSE SERPL-MCNC: 110 MG/DL (ref 74–99)
HBA1C MFR BLD: 5.6 %
HCT VFR BLD AUTO: 38.4 % (ref 36–46)
HGB BLD-MCNC: 12.1 G/DL (ref 12–16)
MCH RBC QN AUTO: 27.9 PG (ref 26–34)
MCHC RBC AUTO-ENTMCNC: 31.5 G/DL (ref 32–36)
MCV RBC AUTO: 89 FL (ref 80–100)
NRBC BLD-RTO: 0 /100 WBCS (ref 0–0)
PLATELET # BLD AUTO: 168 X10*3/UL (ref 150–450)
POTASSIUM SERPL-SCNC: 3.9 MMOL/L (ref 3.5–5.3)
PROT SERPL-MCNC: 5.4 G/DL (ref 6.4–8.2)
RBC # BLD AUTO: 4.33 X10*6/UL (ref 4–5.2)
RH FACTOR (ANTIGEN D): NORMAL
SODIUM SERPL-SCNC: 135 MMOL/L (ref 136–145)
TSH SERPL-ACNC: 1.47 MIU/L (ref 0.44–3.98)
WBC # BLD AUTO: 14.8 X10*3/UL (ref 4.4–11.3)

## 2024-09-26 PROCEDURE — 82947 ASSAY GLUCOSE BLOOD QUANT: CPT

## 2024-09-26 PROCEDURE — 2500000004 HC RX 250 GENERAL PHARMACY W/ HCPCS (ALT 636 FOR OP/ED): Mod: JZ | Performed by: STUDENT IN AN ORGANIZED HEALTH CARE EDUCATION/TRAINING PROGRAM

## 2024-09-26 PROCEDURE — 86901 BLOOD TYPING SEROLOGIC RH(D): CPT | Performed by: STUDENT IN AN ORGANIZED HEALTH CARE EDUCATION/TRAINING PROGRAM

## 2024-09-26 PROCEDURE — 2500000004 HC RX 250 GENERAL PHARMACY W/ HCPCS (ALT 636 FOR OP/ED): Performed by: NURSE ANESTHETIST, CERTIFIED REGISTERED

## 2024-09-26 PROCEDURE — 36415 COLL VENOUS BLD VENIPUNCTURE: CPT | Performed by: STUDENT IN AN ORGANIZED HEALTH CARE EDUCATION/TRAINING PROGRAM

## 2024-09-26 PROCEDURE — 84443 ASSAY THYROID STIM HORMONE: CPT | Performed by: STUDENT IN AN ORGANIZED HEALTH CARE EDUCATION/TRAINING PROGRAM

## 2024-09-26 PROCEDURE — 1220000001 HC OB SEMI-PRIVATE ROOM DAILY

## 2024-09-26 PROCEDURE — 2720000007 HC OR 272 NO HCPCS: Performed by: STUDENT IN AN ORGANIZED HEALTH CARE EDUCATION/TRAINING PROGRAM

## 2024-09-26 PROCEDURE — 2500000004 HC RX 250 GENERAL PHARMACY W/ HCPCS (ALT 636 FOR OP/ED): Performed by: STUDENT IN AN ORGANIZED HEALTH CARE EDUCATION/TRAINING PROGRAM

## 2024-09-26 PROCEDURE — 3700000014 HC AN EPIDURAL BLOCK CHARGE: Performed by: STUDENT IN AN ORGANIZED HEALTH CARE EDUCATION/TRAINING PROGRAM

## 2024-09-26 PROCEDURE — 2500000001 HC RX 250 WO HCPCS SELF ADMINISTERED DRUGS (ALT 637 FOR MEDICARE OP): Performed by: STUDENT IN AN ORGANIZED HEALTH CARE EDUCATION/TRAINING PROGRAM

## 2024-09-26 PROCEDURE — 7210000002 HC LABOR PER HOUR

## 2024-09-26 PROCEDURE — 83036 HEMOGLOBIN GLYCOSYLATED A1C: CPT | Mod: TRILAB,WESLAB | Performed by: STUDENT IN AN ORGANIZED HEALTH CARE EDUCATION/TRAINING PROGRAM

## 2024-09-26 PROCEDURE — 59514 CESAREAN DELIVERY ONLY: CPT | Performed by: STUDENT IN AN ORGANIZED HEALTH CARE EDUCATION/TRAINING PROGRAM

## 2024-09-26 PROCEDURE — 7100000016 HC LABOR RECOVERY PER HOUR: Performed by: STUDENT IN AN ORGANIZED HEALTH CARE EDUCATION/TRAINING PROGRAM

## 2024-09-26 PROCEDURE — 80053 COMPREHEN METABOLIC PANEL: CPT | Performed by: STUDENT IN AN ORGANIZED HEALTH CARE EDUCATION/TRAINING PROGRAM

## 2024-09-26 PROCEDURE — A4649 SURGICAL SUPPLIES: HCPCS | Performed by: STUDENT IN AN ORGANIZED HEALTH CARE EDUCATION/TRAINING PROGRAM

## 2024-09-26 PROCEDURE — 01961 ANES CESAREAN DELIVERY ONLY: CPT | Performed by: NURSE ANESTHETIST, CERTIFIED REGISTERED

## 2024-09-26 PROCEDURE — 85027 COMPLETE CBC AUTOMATED: CPT | Performed by: STUDENT IN AN ORGANIZED HEALTH CARE EDUCATION/TRAINING PROGRAM

## 2024-09-26 RX ORDER — ACETAMINOPHEN 325 MG/1
975 TABLET ORAL EVERY 6 HOURS
Status: DISCONTINUED | OUTPATIENT
Start: 2024-09-26 | End: 2024-09-28 | Stop reason: HOSPADM

## 2024-09-26 RX ORDER — LABETALOL HYDROCHLORIDE 5 MG/ML
20 INJECTION, SOLUTION INTRAVENOUS ONCE AS NEEDED
Status: DISCONTINUED | OUTPATIENT
Start: 2024-09-26 | End: 2024-09-28 | Stop reason: HOSPADM

## 2024-09-26 RX ORDER — BISACODYL 10 MG/1
10 SUPPOSITORY RECTAL DAILY PRN
Status: DISCONTINUED | OUTPATIENT
Start: 2024-09-26 | End: 2024-09-28 | Stop reason: HOSPADM

## 2024-09-26 RX ORDER — DIPHENHYDRAMINE HCL 25 MG
25 TABLET ORAL EVERY 4 HOURS PRN
Status: DISCONTINUED | OUTPATIENT
Start: 2024-09-26 | End: 2024-09-28 | Stop reason: HOSPADM

## 2024-09-26 RX ORDER — ADHESIVE BANDAGE
10 BANDAGE TOPICAL
Status: DISCONTINUED | OUTPATIENT
Start: 2024-09-26 | End: 2024-09-28 | Stop reason: HOSPADM

## 2024-09-26 RX ORDER — SIMETHICONE 80 MG
80 TABLET,CHEWABLE ORAL 4 TIMES DAILY PRN
Status: DISCONTINUED | OUTPATIENT
Start: 2024-09-26 | End: 2024-09-28 | Stop reason: HOSPADM

## 2024-09-26 RX ORDER — CEFAZOLIN SODIUM 2 G/100ML
2 INJECTION, SOLUTION INTRAVENOUS ONCE
Status: COMPLETED | OUTPATIENT
Start: 2024-09-26 | End: 2024-09-26

## 2024-09-26 RX ORDER — SODIUM CHLORIDE, SODIUM LACTATE, POTASSIUM CHLORIDE, CALCIUM CHLORIDE 600; 310; 30; 20 MG/100ML; MG/100ML; MG/100ML; MG/100ML
125 INJECTION, SOLUTION INTRAVENOUS CONTINUOUS
Status: DISCONTINUED | OUTPATIENT
Start: 2024-09-26 | End: 2024-09-28 | Stop reason: HOSPADM

## 2024-09-26 RX ORDER — DOCUSATE SODIUM 100 MG/1
100 CAPSULE, LIQUID FILLED ORAL 2 TIMES DAILY
Status: DISCONTINUED | OUTPATIENT
Start: 2024-09-26 | End: 2024-09-28 | Stop reason: HOSPADM

## 2024-09-26 RX ORDER — METHYLERGONOVINE MALEATE 0.2 MG/ML
0.2 INJECTION INTRAVENOUS ONCE AS NEEDED
Status: DISCONTINUED | OUTPATIENT
Start: 2024-09-26 | End: 2024-09-28 | Stop reason: HOSPADM

## 2024-09-26 RX ORDER — KETOROLAC TROMETHAMINE 30 MG/ML
INJECTION, SOLUTION INTRAMUSCULAR; INTRAVENOUS AS NEEDED
Status: DISCONTINUED | OUTPATIENT
Start: 2024-09-26 | End: 2024-09-26

## 2024-09-26 RX ORDER — OXYTOCIN/0.9 % SODIUM CHLORIDE 30/500 ML
60 PLASTIC BAG, INJECTION (ML) INTRAVENOUS CONTINUOUS
Status: DISCONTINUED | OUTPATIENT
Start: 2024-09-26 | End: 2024-09-28 | Stop reason: HOSPADM

## 2024-09-26 RX ORDER — PHENYLEPHRINE HCL IN 0.9% NACL 0.4MG/10ML
SYRINGE (ML) INTRAVENOUS AS NEEDED
Status: DISCONTINUED | OUTPATIENT
Start: 2024-09-26 | End: 2024-09-26

## 2024-09-26 RX ORDER — CARBOPROST TROMETHAMINE 250 UG/ML
250 INJECTION, SOLUTION INTRAMUSCULAR ONCE AS NEEDED
Status: DISCONTINUED | OUTPATIENT
Start: 2024-09-26 | End: 2024-09-28 | Stop reason: HOSPADM

## 2024-09-26 RX ORDER — OXYTOCIN/0.9 % SODIUM CHLORIDE 30/500 ML
60 PLASTIC BAG, INJECTION (ML) INTRAVENOUS ONCE AS NEEDED
Status: DISCONTINUED | OUTPATIENT
Start: 2024-09-26 | End: 2024-09-28 | Stop reason: HOSPADM

## 2024-09-26 RX ORDER — LOPERAMIDE HYDROCHLORIDE 2 MG/1
4 CAPSULE ORAL EVERY 2 HOUR PRN
Status: DISCONTINUED | OUTPATIENT
Start: 2024-09-26 | End: 2024-09-28 | Stop reason: HOSPADM

## 2024-09-26 RX ORDER — FAMOTIDINE 10 MG/ML
20 INJECTION INTRAVENOUS ONCE
Status: COMPLETED | OUTPATIENT
Start: 2024-09-26 | End: 2024-09-26

## 2024-09-26 RX ORDER — KETOROLAC TROMETHAMINE 30 MG/ML
30 INJECTION, SOLUTION INTRAMUSCULAR; INTRAVENOUS EVERY 6 HOURS
Status: COMPLETED | OUTPATIENT
Start: 2024-09-26 | End: 2024-09-27

## 2024-09-26 RX ORDER — ONDANSETRON HYDROCHLORIDE 2 MG/ML
4 INJECTION, SOLUTION INTRAVENOUS EVERY 6 HOURS PRN
Status: DISCONTINUED | OUTPATIENT
Start: 2024-09-26 | End: 2024-09-28 | Stop reason: HOSPADM

## 2024-09-26 RX ORDER — SODIUM CHLORIDE, SODIUM LACTATE, POTASSIUM CHLORIDE, CALCIUM CHLORIDE 600; 310; 30; 20 MG/100ML; MG/100ML; MG/100ML; MG/100ML
20 INJECTION, SOLUTION INTRAVENOUS CONTINUOUS
Status: DISCONTINUED | OUTPATIENT
Start: 2024-09-26 | End: 2024-09-28 | Stop reason: HOSPADM

## 2024-09-26 RX ORDER — OXYCODONE HYDROCHLORIDE 5 MG/1
5 TABLET ORAL EVERY 4 HOURS PRN
Status: DISCONTINUED | OUTPATIENT
Start: 2024-09-27 | End: 2024-09-28 | Stop reason: HOSPADM

## 2024-09-26 RX ORDER — BUPIVACAINE HYDROCHLORIDE 7.5 MG/ML
INJECTION, SOLUTION INTRASPINAL AS NEEDED
Status: DISCONTINUED | OUTPATIENT
Start: 2024-09-26 | End: 2024-09-26

## 2024-09-26 RX ORDER — BUTORPHANOL TARTRATE 2 MG/ML
2 INJECTION INTRAMUSCULAR; INTRAVENOUS
Status: DISCONTINUED | OUTPATIENT
Start: 2024-09-26 | End: 2024-09-28 | Stop reason: HOSPADM

## 2024-09-26 RX ORDER — NALOXONE HYDROCHLORIDE 0.4 MG/ML
0.1 INJECTION, SOLUTION INTRAMUSCULAR; INTRAVENOUS; SUBCUTANEOUS EVERY 5 MIN PRN
Status: DISCONTINUED | OUTPATIENT
Start: 2024-09-26 | End: 2024-09-28 | Stop reason: HOSPADM

## 2024-09-26 RX ORDER — IBUPROFEN 600 MG/1
600 TABLET ORAL EVERY 6 HOURS
Status: DISCONTINUED | OUTPATIENT
Start: 2024-09-27 | End: 2024-09-28 | Stop reason: HOSPADM

## 2024-09-26 RX ORDER — OXYTOCIN 10 [USP'U]/ML
10 INJECTION, SOLUTION INTRAMUSCULAR; INTRAVENOUS ONCE AS NEEDED
Status: DISCONTINUED | OUTPATIENT
Start: 2024-09-26 | End: 2024-09-28 | Stop reason: HOSPADM

## 2024-09-26 RX ORDER — ONDANSETRON 4 MG/1
4 TABLET, FILM COATED ORAL EVERY 6 HOURS PRN
Status: DISCONTINUED | OUTPATIENT
Start: 2024-09-26 | End: 2024-09-28 | Stop reason: HOSPADM

## 2024-09-26 RX ORDER — OXYCODONE HYDROCHLORIDE 5 MG/1
10 TABLET ORAL EVERY 4 HOURS PRN
Status: DISCONTINUED | OUTPATIENT
Start: 2024-09-27 | End: 2024-09-28 | Stop reason: HOSPADM

## 2024-09-26 RX ORDER — LIDOCAINE 560 MG/1
1 PATCH PERCUTANEOUS; TOPICAL; TRANSDERMAL
Status: DISCONTINUED | OUTPATIENT
Start: 2024-09-26 | End: 2024-09-28 | Stop reason: HOSPADM

## 2024-09-26 RX ORDER — METOCLOPRAMIDE HYDROCHLORIDE 5 MG/ML
10 INJECTION INTRAMUSCULAR; INTRAVENOUS EVERY 6 HOURS PRN
Status: DISCONTINUED | OUTPATIENT
Start: 2024-09-26 | End: 2024-09-28 | Stop reason: HOSPADM

## 2024-09-26 RX ORDER — TRANEXAMIC ACID 10 MG/ML
1000 INJECTION, SOLUTION INTRAVENOUS ONCE AS NEEDED
Status: DISCONTINUED | OUTPATIENT
Start: 2024-09-26 | End: 2024-09-28 | Stop reason: HOSPADM

## 2024-09-26 RX ORDER — MORPHINE SULFATE 0.5 MG/ML
INJECTION, SOLUTION EPIDURAL; INTRATHECAL; INTRAVENOUS AS NEEDED
Status: DISCONTINUED | OUTPATIENT
Start: 2024-09-26 | End: 2024-09-26

## 2024-09-26 RX ORDER — DEXAMETHASONE SODIUM PHOSPHATE 10 MG/ML
INJECTION INTRAMUSCULAR; INTRAVENOUS AS NEEDED
Status: DISCONTINUED | OUTPATIENT
Start: 2024-09-26 | End: 2024-09-26

## 2024-09-26 RX ORDER — PHENYLEPHRINE 10 MG/250 ML(40 MCG/ML)IN 0.9 % SOD.CHLORIDE INTRAVENOUS
CONTINUOUS PRN
Status: DISCONTINUED | OUTPATIENT
Start: 2024-09-26 | End: 2024-09-26

## 2024-09-26 RX ORDER — METOCLOPRAMIDE HYDROCHLORIDE 5 MG/ML
10 INJECTION INTRAMUSCULAR; INTRAVENOUS ONCE
Status: DISCONTINUED | OUTPATIENT
Start: 2024-09-26 | End: 2024-09-28 | Stop reason: HOSPADM

## 2024-09-26 RX ORDER — PHENYLEPHRINE HYDROCHLORIDE 10 MG/ML
INJECTION INTRAVENOUS
Status: DISPENSED
Start: 2024-09-26 | End: 2024-09-26

## 2024-09-26 RX ORDER — ENOXAPARIN SODIUM 100 MG/ML
40 INJECTION SUBCUTANEOUS EVERY 24 HOURS
Status: DISCONTINUED | OUTPATIENT
Start: 2024-09-27 | End: 2024-09-28 | Stop reason: HOSPADM

## 2024-09-26 RX ORDER — HYDRALAZINE HYDROCHLORIDE 20 MG/ML
5 INJECTION INTRAMUSCULAR; INTRAVENOUS ONCE AS NEEDED
Status: DISCONTINUED | OUTPATIENT
Start: 2024-09-26 | End: 2024-09-28 | Stop reason: HOSPADM

## 2024-09-26 RX ORDER — NIFEDIPINE 10 MG/1
10 CAPSULE ORAL ONCE AS NEEDED
Status: DISCONTINUED | OUTPATIENT
Start: 2024-09-26 | End: 2024-09-28 | Stop reason: HOSPADM

## 2024-09-26 RX ORDER — MISOPROSTOL 200 UG/1
800 TABLET ORAL ONCE AS NEEDED
Status: DISCONTINUED | OUTPATIENT
Start: 2024-09-26 | End: 2024-09-28 | Stop reason: HOSPADM

## 2024-09-26 RX ORDER — DIPHENHYDRAMINE HYDROCHLORIDE 50 MG/ML
25 INJECTION INTRAMUSCULAR; INTRAVENOUS EVERY 4 HOURS PRN
Status: DISCONTINUED | OUTPATIENT
Start: 2024-09-26 | End: 2024-09-28 | Stop reason: HOSPADM

## 2024-09-26 RX ORDER — METOCLOPRAMIDE 10 MG/1
10 TABLET ORAL EVERY 6 HOURS PRN
Status: DISCONTINUED | OUTPATIENT
Start: 2024-09-26 | End: 2024-09-28 | Stop reason: HOSPADM

## 2024-09-26 RX ORDER — SODIUM CITRATE AND CITRIC ACID MONOHYDRATE 334; 500 MG/5ML; MG/5ML
30 SOLUTION ORAL ONCE
Status: COMPLETED | OUTPATIENT
Start: 2024-09-26 | End: 2024-09-26

## 2024-09-26 RX ORDER — POLYETHYLENE GLYCOL 3350 17 G/17G
17 POWDER, FOR SOLUTION ORAL 2 TIMES DAILY PRN
Status: DISCONTINUED | OUTPATIENT
Start: 2024-09-26 | End: 2024-09-28 | Stop reason: HOSPADM

## 2024-09-26 RX ADMIN — FAMOTIDINE 20 MG: 10 INJECTION, SOLUTION INTRAVENOUS at 07:23

## 2024-09-26 RX ADMIN — Medication 60 MILLI-UNITS/MIN: at 08:38

## 2024-09-26 RX ADMIN — ONDANSETRON HYDROCHLORIDE 4 MG: 2 INJECTION INTRAMUSCULAR; INTRAVENOUS at 09:31

## 2024-09-26 RX ADMIN — Medication 30 ML: at 07:23

## 2024-09-26 RX ADMIN — DOCUSATE SODIUM 100 MG: 100 CAPSULE, LIQUID FILLED ORAL at 10:28

## 2024-09-26 RX ADMIN — ACETAMINOPHEN 975 MG: 325 TABLET, FILM COATED ORAL at 14:38

## 2024-09-26 RX ADMIN — CARIPRAZINE 1.5 MG: 1.5 CAPSULE, GELATIN COATED ORAL at 10:28

## 2024-09-26 RX ADMIN — KETOROLAC TROMETHAMINE 30 MG: 30 INJECTION, SOLUTION INTRAMUSCULAR; INTRAVENOUS at 14:38

## 2024-09-26 RX ADMIN — ESCITALOPRAM OXALATE 10 MG: 10 TABLET ORAL at 10:28

## 2024-09-26 RX ADMIN — SODIUM CHLORIDE, SODIUM LACTATE, POTASSIUM CHLORIDE, AND CALCIUM CHLORIDE 500 ML: 600; 310; 30; 20 INJECTION, SOLUTION INTRAVENOUS at 13:16

## 2024-09-26 RX ADMIN — DOCUSATE SODIUM 100 MG: 100 CAPSULE, LIQUID FILLED ORAL at 21:08

## 2024-09-26 RX ADMIN — ACETAMINOPHEN 975 MG: 325 TABLET, FILM COATED ORAL at 21:08

## 2024-09-26 RX ADMIN — PROMETHAZINE HYDROCHLORIDE 12.5 MG: 25 INJECTION INTRAMUSCULAR; INTRAVENOUS at 14:52

## 2024-09-26 RX ADMIN — KETOROLAC TROMETHAMINE 30 MG: 30 INJECTION, SOLUTION INTRAMUSCULAR; INTRAVENOUS at 21:09

## 2024-09-26 RX ADMIN — BUTORPHANOL TARTRATE 2 MG: 2 INJECTION, SOLUTION INTRAMUSCULAR; INTRAVENOUS at 03:51

## 2024-09-26 SDOH — HEALTH STABILITY: MENTAL HEALTH: CURRENT SMOKER: 1

## 2024-09-26 ASSESSMENT — PAIN SCALES - GENERAL
PAINLEVEL_OUTOF10: 3
PAINLEVEL_OUTOF10: 3
PAINLEVEL_OUTOF10: 6
PAINLEVEL_OUTOF10: 0 - NO PAIN
PAINLEVEL_OUTOF10: 6
PAINLEVEL_OUTOF10: 0 - NO PAIN
PAINLEVEL_OUTOF10: 0 - NO PAIN
PAIN_LEVEL: 0
PAINLEVEL_OUTOF10: 0 - NO PAIN
PAINLEVEL_OUTOF10: 0 - NO PAIN

## 2024-09-26 ASSESSMENT — PAIN DESCRIPTION - DESCRIPTORS: DESCRIPTORS: ACHING

## 2024-09-26 NOTE — ANESTHESIA POSTPROCEDURE EVALUATION
Patient: Hortensia Castillo    Procedure Summary       Date: 24 Room / Location: [facility]    Anesthesia Start: 737 Anesthesia Stop: 846    Procedure: OBGYN Delivery  Section Diagnosis:       (Failure to Progress)      (Other (Add Comments))    Surgeons: Mariela Wasserman MD Responsible Provider: TIEN Montilla    Anesthesia Type: spinal ASA Status: 2            Anesthesia Type: spinal    Vitals Value Taken Time   /80 24 0846   Temp 97.0 24 0846   Pulse 63 24 0846   Resp 18 24 0846   SpO2 98 24 0846       Anesthesia Post Evaluation    Patient location during evaluation: bedside  Patient participation: complete - patient participated  Level of consciousness: awake and alert  Pain score: 0  Pain management: adequate  Airway patency: patent  Cardiovascular status: acceptable  Respiratory status: acceptable  Hydration status: acceptable  Postoperative Nausea and Vomiting: none        There were no known notable events for this encounter.

## 2024-09-26 NOTE — PROGRESS NOTES
Intrapartum Progress Note    Assessment/Plan   Hortensia Castillo is a 24 y.o.  at 39w2d. COREY: 10/1/2024, by Interfaced COREY.     FHR Cat I. GBS negative. Vital signs stable. Cervix unchanged despite contractions and > 12 hr pitocin. Cervical exams still very intolerable to pt  -Discussed trial of cvd or CRB with pt, but pt would like to avoid vaginal insertion and 12 + hr of medication use discussed  -Discussed stop and restart pitocin but still early labor, minimal changes so this will still likely take 12+ hours  -Discussed early epidural but pt wishes to be able to ambulate in early labor, is uncomfortable with the idea of no ambulation for 12+ hours with above options  -C section for failed induction discussed. Pt would like to proceed with this options. Risks, alternatives, benefits of this discussed with pt and family at bedside, questions answered. Pt to remain NPO, and will conduct CS this am.   -2 g Ancef ppx      Assessment & Plan  Term pregnancy ([facility])    Pregnancy Problems (from 24 to present)       Problem Noted Resolved    Term pregnancy ([facility]) 2024 by Maris Sandoval MD No    Priority:  Medium      35 weeks gestation of pregnancy ([facility]) 2024 by Rita Benitez MD No    Priority:  Medium      Overview Addendum 2024  9:42 AM by Justina Tse MD     Sees Mariela Wasserman at [facility] for routine PNC  Planning to place baby for adoption after delivery.                 Subjective   Pt evaluation for progress. Comfortable only occasionally feels contractions      Objective   Last Vitals:  Temp Pulse Resp BP MAP Pulse Ox   36.5 °C (97.7 °F) 66 18 115/60 78 96 %     Vitals Min/Max Last 24 Hours:  Temp  Min: 36.5 °C (97.7 °F)  Max: 37.2 °C (98.9 °F)  Pulse  Min: 66  Max: 116  Resp  Min: 16  Max: 20  BP  Min: 112/69  Max: 118/56  MAP (mmHg)  Min: 78  Max: 85    Intake/Output:  No intake or output data in the 24 hours ending 24 0609    Physical Examination:  GENERAL: Examination  reveals a well developed, well nourished, gravid female in no acute distress. She is alert and cooperative.  FHR is 140 , with Accelerations, and a Category II tracing.    [place] reading:  q 2-3 min  CERVIX: 1 cm dilated, 50 % effaced, -2 station; MEMBRANES are Intact    Lab Review:  Lab Results   Component Value Date    WBC 9.9 09/24/2024    HGB 12.5 09/24/2024    HCT 38.1 09/24/2024     09/24/2024

## 2024-09-26 NOTE — ANESTHESIA PROCEDURE NOTES
Spinal Block    Patient location during procedure: OB  Start time: 9/26/2024 7:43 AM  End time: 9/26/2024 7:48 AM  Reason for block: labor analgesia  Staffing  Performed: CRNA   Authorized by: TIEN Montilla    Performed by: TIEN Montilla    Preanesthetic Checklist  Completed: patient identified, IV checked, risks and benefits discussed, surgical consent, pre-op evaluation, timeout performed and sterile techniques followed  Block Timeout  RN/Licensed healthcare professional reads aloud to the Anesthesia provider and entire team: Patient identity, procedure with side and site, patient position, and as applicable the availability of implants/special equipment/special requirements.  Patient on coagulant treatment: no  Timeout performed at: 9/26/2024 7:43 AM  Spinal Block  Patient position: sitting  Prep: ChloraPrep  Sterility prep: mask, hand hygiene, gloves, cap and drape  Sedation level: no sedation  Patient monitoring: heart rate, continuous pulse oximetry and blood pressure  Approach: midline  Vertebral space: L2-3  Injection technique: single-shot  Needle  Needle type: pencil-point   Needle gauge: 24 G  Needle length: 4 in  Free flowing CSF: yes    Assessment  Sensory level: T4 bilateral  Block outcome: block to be assessed in the OR  Procedure assessment: patient tolerated procedure well with no immediate complications

## 2024-09-26 NOTE — PROGRESS NOTES
Intrapartum Progress Note    Assessment/Plan   Hortensia Castillo is a 24 y.o.  at 39w1d. COREY: 10/1/2024, by Interfaced COREY.     FHR Cat I. GBS negative. Vital signs stable. S/p cytotec x 6. Unable to tolerate pelvic exam, unable to tolerate cervadil, CRB due to vaginal placement. Pitocin at 18.  -Continue pitocin due to FHR cat I. Plan early epidural when uncomfortable/desired to facilitate cervical evaluation/labor and monitoring. Last exam, cervix external os still likely only 1 cm. If FHR concerns, or other concerns, will stop pitocin. Continue with continuous monitoring. Pt declines epidural at this time      Assessment & Plan  Term pregnancy ([facility])    Pregnancy Problems (from 24 to present)       Problem Noted Resolved    Term pregnancy ([facility]) 2024 by Maris Sandoval MD No    Priority:  Medium      35 weeks gestation of pregnancy ([facility]) 2024 by Rita Benitez MD No    Priority:  Medium      Overview Addendum 2024  9:42 AM by Justina Tse MD     Sees Mariela Wasserman at [facility] for routine PNC  Planning to place baby for adoption after delivery.                 Subjective   Pt evaluation for progress. Comfortable feeling contractions mildly      Objective   Last Vitals:  Temp Pulse Resp BP MAP Pulse Ox   37.2 °C (98.9 °F) 87 18 112/69 85 96 %     Vitals Min/Max Last 24 Hours:  Temp  Min: 36.5 °C (97.7 °F)  Max: 37.2 °C (98.9 °F)  Pulse  Min: 68  Max: 96  Resp  Min: 16  Max: 20  BP  Min: 103/55  Max: 125/68  MAP (mmHg)  Min: 68  Max: 86    Intake/Output:    Intake/Output Summary (Last 24 hours) at 2024 2240  Last data filed at 2024 0511  Gross per 24 hour   Intake 988 ml   Output --   Net 988 ml       Physical Examination:  GENERAL: Examination reveals a well developed, well nourished, gravid female in no acute distress. She is alert and cooperative.  FHR is 140 , with Accelerations, Variable decelerations, and a Category II tracing.    [place] readin -3 min  CERVIX: 1  cm dilated, 50 % effaced, -2 station; MEMBRANES are Intact    Lab Review:  Lab Results   Component Value Date    WBC 9.9 09/24/2024    HGB 12.5 09/24/2024    HCT 38.1 09/24/2024     09/24/2024

## 2024-09-26 NOTE — ANESTHESIA PREPROCEDURE EVALUATION
Patient: Hortensia Castillo    Evaluation Method: In-person visit    Procedure Information    Date: 09/24/24  Procedure: Labor Consult         Relevant Problems   Anesthesia (within normal limits)      Cardiac (within normal limits)      Pulmonary (within normal limits)      Neuro   (+) Anxiety   (+) Bipolar disorder in partial remission (Multi)      GI (within normal limits)      /Renal (within normal limits)      Liver (within normal limits)      Endocrine   (+) Gestational diabetes mellitus (GDM), antepartum (HHS-HCC)      Hematology (within normal limits)      Musculoskeletal (within normal limits)      HEENT (within normal limits)      ID (within normal limits)      Skin (within normal limits)      GYN   (+) 35 weeks gestation of pregnancy ([facility])       Clinical information reviewed:   Tobacco  Allergies  Meds   Med Hx  Surg Hx   Fam Hx  Soc Hx        NPO Detail:  No data recorded     OB/Gyn Evaluation    Present Pregnancy    Patient is pregnant now.   Obstetric History                Physical Exam    Airway  Mallampati: II  TM distance: <3 FB  Neck ROM: full     Cardiovascular - normal exam     Dental - normal exam     Pulmonary - normal exam     Abdominal - normal exam             Anesthesia Plan    History of general anesthesia?: yes  History of complications of general anesthesia?: no    ASA 2     spinal     The patient is a current smoker.    Anesthetic plan and risks discussed with patient.  Use of blood products discussed with patient who consented to blood products.    Plan discussed with CRNA.

## 2024-09-26 NOTE — L&D DELIVERY NOTE
OB Delivery Note  2024  Hortensia Castillo  24 y.o.   , Low Transverse       Gestational Age: 39w2d  /Para:   Quantitative Blood Loss: Admission to Discharge: 1204 mL (2024  8:06 PM - 2024 12:50 PM)         Section Operative Note    Date: 2024  [place] Location: [facility]    Name: Hortensia Castillo, : 2000, Age: 24 y.o., MRN: [id number], Sex: female    Diagnosis  * No Diagnosis Codes entered * * No Diagnosis Codes entered *     Procedures    * OBGYN Delivery  Section    Surgeons      * Mariela Wasserman - Primary    Resident/Fellow/Other Assistant:  Ravinder Hargrove   Assistant:   There was no resident available for assistance. The surgical assistant helped with retraction, visualization, delivery of the infant, and hemostasis.      Procedure Summary  Anesthesia: Spinal  ASA: II  Anesthesia Staff: CRNA: REJI Montilla-CRNA  Estimated Blood Loss: 565 mL  Intra-op Medications: * Intraprocedure medication information is unavailable because the case start and end events have not been set *      Specimen: No specimens collected     Staff:   Circulator:   Scrub Person:              Findings: Normal external vaginal genitalia and mucosa, normal appearing cervix dilated to 1 cm, gravid. Normal appearing ovaries and tubes. Fetus cephalic, head not engaged in pelvis      Complications:  None; patient tolerated the procedure well.     Disposition: PACU - hemodynamically stable.  Condition: stable      Informed Consent:  The risks, benefits, complications, and alternatives were discussed with the patient. The patient understood that the risks of  section include, but are not limited to: injury to nearby structures or organs, infection, blood loss and possible need for transfusion, and potential need for more surgery including hysterectomy. The patient stated understanding and desired to proceed. All questions were answered. The site of surgery was properly noted  and marked. The patient was identified as Hortensia Castillo and the procedure verified as a  delivery. A Time Out was held and the above information confirmed.    Procedure Details:  The patient was taken to the operating room where Spinal anesthesia was found to be adequate. Antibiotics were given for infection prophylaxis. She was prepped and draped in the normal sterile fashion in the dorsal supine position with leftward tilt. A Pfannenstiel skin incision was made with scalpel. The incision was carried down to the fascia with bovie cauterization. The fascia was incised and extended laterally with Blancas scissors. The inferior aspect of the fascia was grasped with Kocher clamps. The underlying rectus and pyramidalis muscles were dissected off with Blancas scissors. In a similar fashion, the superior aspect of the fascia was elevated with Kocher clamps, and the rectus muscle was dissected off. The rectus muscles were  bluntly down the midline down to the level of the pubic symphysis. The peritoneum was identified and entered sharply. Peritoneal incision was extended superiorly and inferiorly with good visualization of the bladder.    The bladder blade was inserted, vesicouterine peritoneum was identified . The lower uterine segment was then incised with a scalpel and was extended bluntly. The amniotic sac was ruptured and Clear color noted. The bladder blade was removed and the infant was delivered atraumatically using the usual maneuvers. The remainder of the infant was delivered, the cord clamped and cut, and the baby was handed off to the awaiting clinicians.     The placenta was removed via manual massage. The uterus was then exteriorized and cleared of all clots and debris. IM pitocin was administered to diminish risk of atony due to prolonged use of pitocin for labor. The hysterotomy was repaired with suture of 0 Vicryl in a running locked fashion. A second imbricating layer of the same suture was  placed and good hemostasis observed. The ovaries and tubes appeared normal bilaterally. The uterus, tubes, and ovaries were then returned to the abdomen and pelvis. The uterine incision was reinspected and found to be hemostatic. The subfascial spaces were inspected and noted to be hemostatic. The fascia was then reapproximated with  running suture of 0 Vicryl. The subcutaneous was closed with 3.0 vicryl in running stitch. The skin was closed with 4-0 Vicryl sutures in subcutaneous fashion.    The patient tolerated the procedure well. Sponge, instrument, and needle counts were correct and the patient was taken to the recovery room in good and stable condition.                        Razia Castillo [[id number]]      Labor Events    Rupture date/time: 2024 08  Rupture type: Artificial  Fluid color: Clear  Fluid odor: None  Labor type: Induced Onset of Labor  Labor allowed to proceed with plans for an attempted vaginal birth?: Yes  Induction: Misoprostol, Oxytocin  Induction indications: Risk Reducing  Complications: Failure to Progress in First Stage       Labor Length    3rd stage: 0h 01m       Placenta    Placenta delivery date/time: 2024 0808  Placenta removal: Manual removal  Placenta appearance: Intact  Placenta disposition: discarded       Cord    Vessels: 3 vessels  Complications: None  Delayed cord clamping?: No  Cord blood disposition: Lab  Gases sent?: No  Stem cell collection (by provider): No       Lacerations    Episiotomy: None  Perineal laceration: None  Other lacerations?: No  Repair suture: None       Anesthesia    Method: Spinal       Operative Delivery    Forceps attempted?: No  Vacuum extractor attempted?: No       Shoulder Dystocia    Shoulder dystocia present?: No       [place] Delivery    Time head delivered: 2024 08:07:00  Birth date/time: 2024 08:07:00  Delivery type: , Low Transverse   categorization: primary   priority: routine  Indications for  : Unsuccessful Induction of Labor  Incision type: low transverse  Complications: Failure to Progress in First Stage       Resuscitation    Method: Suctioning, Tactile stimulation       Apgars    Living status: Living  Apgar Component Scores:  1 min.:  5 min.:  10 min.:  15 min.:  20 min.:    Skin color:  1  1       Heart rate:  2  2       Reflex irritability:  2  2       Muscle tone:  2  2       Respiratory effort:  2  2       Total:  9  9       Apgars assigned by: EDSON       Delivery Providers    Delivering clinician: Mariela Wasserman MD   Provider Role    Esther Whatley, RN Delivery Nurse    Romelia Jack, RN Nursery Nurse     Resident    Lily Garvey RN Delivery Nurse                     Mariela Wasserman MD

## 2024-09-26 NOTE — CARE PLAN
The patient's goals for the shift include have baby    The clinical goals for the shift include safe delivery      Problem: Vaginal Birth or  Section  Goal: Fetal and maternal status remain reassuring during the birth process  Outcome: Progressing  Goal: Tolerate CRB for IOL placement maintenance until dislodgement/removal 12hrs after placement  Outcome: Progressing  Goal: Prevention of malpresentation/labor dystocia through delivery  Outcome: Progressing  Goal: Demonstrates labor coping techniques through delivery  Outcome: Progressing  Goal: Minimal s/sx of HDP and BP<160/110  Outcome: Progressing  Goal: No s/sx of infection through recovery  Outcome: Progressing  Goal: No s/sx of hemorrhage through recovery  Outcome: Progressing     Problem: Postpartum  Goal: Experiences normal postpartum course  Outcome: Progressing  Goal: Appropriate maternal -  bonding  Outcome: Progressing  Goal: Establish and maintain infant feeding pattern for adequate nutrition  Outcome: Progressing  Goal: Incisions, wounds, or drain sites healing without S/S of infection  Outcome: Progressing  Goal: No s/sx infection  Outcome: Progressing  Goal: No s/sx of hemorrhage  Outcome: Progressing  Goal: Minimal s/sx of HDP and BP<160/110  Outcome: Progressing     Problem: Pain - Adult  Goal: Verbalizes/displays adequate comfort level or baseline comfort level  Outcome: Progressing     Problem: Safety - Adult  Goal: Free from fall injury  Outcome: Progressing     Problem: Discharge Planning  Goal: Discharge to home or other facility with appropriate resources  Outcome: Progressing

## 2024-09-26 NOTE — PROGRESS NOTES
Postpartum Progress Note    Pt eval for unable to obtain patient temperature. VSS, pulse and bp. Pt states she feels cool and clammy. Was nauseous after delivery but feeling better now since zofran, but not an appetite yet. Pt has been OOB since delivery, pain controlled with meds. Denies HA, CP, SOB, dizziness. Etienne draining clear urine. Normal lochia on pad. Responds appropriately to exam, neurologically intact.   -Obtain labs  -trend vitals  -Continue IV hydration         Last Vitals:  Temp Pulse Resp BP MAP Pulse Ox    (Unable to obtain tempurature) 56 18 107/59 76 98 %     Vitals Min/Max Last 24 Hours:  Temp  Min: 35.6 °C (96.1 °F)  Max: 37.2 °C (98.9 °F)  Pulse  Min: 52  Max: 116  Resp  Min: 16  Max: 20  BP  Min: 94/55  Max: 133/66  MAP (mmHg)  Min: 69  Max: 88

## 2024-09-27 LAB
ERYTHROCYTE [DISTWIDTH] IN BLOOD BY AUTOMATED COUNT: 13.3 % (ref 11.5–14.5)
GLUCOSE BLD MANUAL STRIP-MCNC: 88 MG/DL (ref 74–99)
HCT VFR BLD AUTO: 30.4 % (ref 36–46)
HGB BLD-MCNC: 9.8 G/DL (ref 12–16)
MCH RBC QN AUTO: 28 PG (ref 26–34)
MCHC RBC AUTO-ENTMCNC: 32.2 G/DL (ref 32–36)
MCV RBC AUTO: 87 FL (ref 80–100)
NRBC BLD-RTO: 0 /100 WBCS (ref 0–0)
PLATELET # BLD AUTO: 176 X10*3/UL (ref 150–450)
RBC # BLD AUTO: 3.5 X10*6/UL (ref 4–5.2)
WBC # BLD AUTO: 14.8 X10*3/UL (ref 4.4–11.3)

## 2024-09-27 PROCEDURE — 85027 COMPLETE CBC AUTOMATED: CPT | Performed by: STUDENT IN AN ORGANIZED HEALTH CARE EDUCATION/TRAINING PROGRAM

## 2024-09-27 PROCEDURE — 36415 COLL VENOUS BLD VENIPUNCTURE: CPT | Performed by: STUDENT IN AN ORGANIZED HEALTH CARE EDUCATION/TRAINING PROGRAM

## 2024-09-27 PROCEDURE — 2500000001 HC RX 250 WO HCPCS SELF ADMINISTERED DRUGS (ALT 637 FOR MEDICARE OP): Performed by: STUDENT IN AN ORGANIZED HEALTH CARE EDUCATION/TRAINING PROGRAM

## 2024-09-27 PROCEDURE — 82947 ASSAY GLUCOSE BLOOD QUANT: CPT

## 2024-09-27 PROCEDURE — 2500000004 HC RX 250 GENERAL PHARMACY W/ HCPCS (ALT 636 FOR OP/ED): Performed by: STUDENT IN AN ORGANIZED HEALTH CARE EDUCATION/TRAINING PROGRAM

## 2024-09-27 PROCEDURE — 1220000001 HC OB SEMI-PRIVATE ROOM DAILY

## 2024-09-27 RX ADMIN — ENOXAPARIN SODIUM 40 MG: 40 INJECTION SUBCUTANEOUS at 05:24

## 2024-09-27 RX ADMIN — ESCITALOPRAM OXALATE 10 MG: 10 TABLET ORAL at 09:51

## 2024-09-27 RX ADMIN — ACETAMINOPHEN 975 MG: 325 TABLET, FILM COATED ORAL at 02:52

## 2024-09-27 RX ADMIN — ACETAMINOPHEN 975 MG: 325 TABLET, FILM COATED ORAL at 08:30

## 2024-09-27 RX ADMIN — IBUPROFEN 600 MG: 600 TABLET, FILM COATED ORAL at 08:29

## 2024-09-27 RX ADMIN — IBUPROFEN 600 MG: 600 TABLET, FILM COATED ORAL at 15:02

## 2024-09-27 RX ADMIN — KETOROLAC TROMETHAMINE 30 MG: 30 INJECTION, SOLUTION INTRAMUSCULAR; INTRAVENOUS at 02:53

## 2024-09-27 RX ADMIN — IBUPROFEN 600 MG: 600 TABLET, FILM COATED ORAL at 20:51

## 2024-09-27 RX ADMIN — ACETAMINOPHEN 975 MG: 325 TABLET, FILM COATED ORAL at 15:01

## 2024-09-27 RX ADMIN — DOCUSATE SODIUM 100 MG: 100 CAPSULE, LIQUID FILLED ORAL at 09:02

## 2024-09-27 RX ADMIN — CARIPRAZINE 1.5 MG: 1.5 CAPSULE, GELATIN COATED ORAL at 09:52

## 2024-09-27 RX ADMIN — ACETAMINOPHEN 975 MG: 325 TABLET, FILM COATED ORAL at 20:51

## 2024-09-27 ASSESSMENT — PAIN DESCRIPTION - LOCATION: LOCATION: INCISION

## 2024-09-27 ASSESSMENT — PAIN DESCRIPTION - DESCRIPTORS: DESCRIPTORS: ACHING;SHARP

## 2024-09-27 ASSESSMENT — PAIN SCALES - GENERAL
PAINLEVEL_OUTOF10: 1
PAINLEVEL_OUTOF10: 0 - NO PAIN
PAINLEVEL_OUTOF10: 2
PAINLEVEL_OUTOF10: 2
PAINLEVEL_OUTOF10: 3
PAINLEVEL_OUTOF10: 3
PAINLEVEL_OUTOF10: 4
PAINLEVEL_OUTOF10: 4
PAINLEVEL_OUTOF10: 2
PAINLEVEL_OUTOF10: 0 - NO PAIN

## 2024-09-27 ASSESSMENT — PAIN DESCRIPTION - ORIENTATION: ORIENTATION: LEFT

## 2024-09-27 NOTE — CARE PLAN
The patient's goals for the shift include pain control, rest    The clinical goals for the shift include rest

## 2024-09-27 NOTE — PROGRESS NOTES
Postpartum Progress Note    Assessment/Plan   Hortensia Castillo is a 24 y.o., , who delivered at 39w2d gestation and is now postpartum day 1 s/p C/S    Routine care  Expect discharge home tomorrow  Plan circumcision on  later today, RBA d/w pt in detail and she desires to proceed.    Assessment & Plan  Term pregnancy ([facility])    Failed medical induction of labor ([facility])    Pregnancy Problems (from 24 to present)       Problem Noted Resolved    Term pregnancy ([facility]) 2024 by Maris Sandoval MD No    Priority:  Medium      35 weeks gestation of pregnancy ([facility]) 2024 by Rita Benitez MD No    Priority:  Medium      Overview Addendum 2024  9:42 AM by Justina Tse MD     Sees Mariela Wasserman at [facility] for routine PNC  Planning to place baby for adoption after delivery.               Hospital course: no complications   section delivery  Patient is not breastfeedingThe patient's blood type is B POS. The baby's blood type is AB NEG. Rhogam is not indicated.    Subjective     Pt with minimal pain, mainly when she is bending over.  Ambulating in room well.  Normal urination.  No BM but +flatus.  No N/V, tolerating pos    Objective   Allergies:   Abilify [aripiprazole], Penicillin g, and Zyrtec [cetirizine]         Last Vitals:  Temp Pulse Resp BP MAP Pulse Ox   36.8 °C (98.2 °F) 101 17 109/58 77 100 %     Vitals Min/Max Last 24 Hours:  Temp  Min: 36.3 °C (97.3 °F)  Max: 36.8 °C (98.2 °F)  Pulse  Min: 52  Max: 101  Resp  Min: 16  Max: 18  BP  Min: 94/55  Max: 124/68  MAP (mmHg)  Min: 69  Max: 93    Intake/Output:     Intake/Output Summary (Last 24 hours) at 2024 0952  Last data filed at 2024 2107  Gross per 24 hour   Intake --   Output 2124 ml   Net -2124 ml       Physical Exam:  General: Examination reveals a well developed, well nourished, female, in no acute distress. She is alert and cooperative.  Incision: healing well, no drainage, well approximated.  Fundus:  firm, below umbilicus, and nontender.  Extremities: no redness or tenderness in the calves or thighs, no edema.    Lab Data:  Lab Results   Component Value Date    ABO B 09/26/2024    LABRH POS 09/26/2024    ABSCRN NEG 09/26/2024     Lab Results   Component Value Date    WBC 14.8 (H) 09/27/2024    HGB 9.8 (L) 09/27/2024    HCT 30.4 (L) 09/27/2024     09/27/2024

## 2024-09-27 NOTE — PROGRESS NOTES
Social Work Progress Note     Patient Name: Hortensia Castillo   [place] : 22      History: Ms. Castillo is working with [facility] Adoption agency and has already identified adoptive parents who have been at the hospital to provide care for child.       Assessment: [facility] Adoption case-worker, Azeb Mejia, is meeting this morning with all Ms. Castillo to confirm adoption plan and sign the agreement for temporary custody of child to agency and will then meet with adoptive parents to have ICCA (Individual  Agreement) completed.       Plan: [facility] Adoption letter stating discharge plan obtained and placed in child's chart. With this, child is to be discharged to home with named adoptive parents.       Signature: YOUSIF Hopson

## 2024-09-28 ENCOUNTER — PHARMACY VISIT (OUTPATIENT)
Dept: PHARMACY | Facility: CLINIC | Age: 24
End: 2024-09-28
Payer: MEDICAID

## 2024-09-28 VITALS
HEART RATE: 96 BPM | DIASTOLIC BLOOD PRESSURE: 76 MMHG | HEIGHT: 60 IN | BODY MASS INDEX: 38.87 KG/M2 | SYSTOLIC BLOOD PRESSURE: 118 MMHG | WEIGHT: 198 LBS | TEMPERATURE: 98.6 F | RESPIRATION RATE: 16 BRPM | OXYGEN SATURATION: 96 %

## 2024-09-28 PROCEDURE — 2500000004 HC RX 250 GENERAL PHARMACY W/ HCPCS (ALT 636 FOR OP/ED): Performed by: STUDENT IN AN ORGANIZED HEALTH CARE EDUCATION/TRAINING PROGRAM

## 2024-09-28 PROCEDURE — 2500000001 HC RX 250 WO HCPCS SELF ADMINISTERED DRUGS (ALT 637 FOR MEDICARE OP): Performed by: STUDENT IN AN ORGANIZED HEALTH CARE EDUCATION/TRAINING PROGRAM

## 2024-09-28 PROCEDURE — RXMED WILLOW AMBULATORY MEDICATION CHARGE

## 2024-09-28 RX ORDER — ACETAMINOPHEN 325 MG/1
975 TABLET ORAL EVERY 6 HOURS
Qty: 120 TABLET | Refills: 0 | Status: SHIPPED | OUTPATIENT
Start: 2024-09-28 | End: 2024-10-08

## 2024-09-28 RX ORDER — DOCUSATE SODIUM 100 MG/1
100 CAPSULE, LIQUID FILLED ORAL 2 TIMES DAILY
Qty: 20 CAPSULE | Refills: 0 | Status: SHIPPED | OUTPATIENT
Start: 2024-09-28 | End: 2024-10-08

## 2024-09-28 RX ORDER — IBUPROFEN 600 MG/1
600 TABLET ORAL EVERY 6 HOURS
Qty: 40 TABLET | Refills: 0 | Status: SHIPPED | OUTPATIENT
Start: 2024-09-28 | End: 2024-10-08

## 2024-09-28 RX ADMIN — IBUPROFEN 600 MG: 600 TABLET, FILM COATED ORAL at 09:00

## 2024-09-28 RX ADMIN — ACETAMINOPHEN 975 MG: 325 TABLET, FILM COATED ORAL at 08:59

## 2024-09-28 RX ADMIN — IBUPROFEN 600 MG: 600 TABLET, FILM COATED ORAL at 02:58

## 2024-09-28 RX ADMIN — ESCITALOPRAM OXALATE 10 MG: 10 TABLET ORAL at 09:02

## 2024-09-28 RX ADMIN — ENOXAPARIN SODIUM 40 MG: 40 INJECTION SUBCUTANEOUS at 09:00

## 2024-09-28 RX ADMIN — ACETAMINOPHEN 975 MG: 325 TABLET, FILM COATED ORAL at 02:58

## 2024-09-28 RX ADMIN — CARIPRAZINE 1.5 MG: 1.5 CAPSULE, GELATIN COATED ORAL at 09:01

## 2024-09-28 ASSESSMENT — PAIN SCALES - GENERAL
PAIN_LEVEL: 0
PAINLEVEL_OUTOF10: 6

## 2024-09-28 NOTE — PROGRESS NOTES
Postpartum Progress Note    Assessment/Plan   Hortensia Castillo is a 24 y.o., , who delivered at 39w2d gestation and is now postpartum day 2.    OK for discharge  Mom present  Rx to pharmacy  Assessment & Plan  Term pregnancy ([facility])    Failed medical induction of labor ([facility])    Pregnancy Problems (from 24 to present)       Problem Noted Resolved    Term pregnancy ([facility]) 2024 by Maris Sandoval MD No    Priority:  Medium      35 weeks gestation of pregnancy ([facility]) 2024 by Rita Benitez MD No    Priority:  Medium      Overview Addendum 2024  9:42 AM by Justina Tse MD     Sees Mariela Wasserman at [facility] for routine PNC  Planning to place baby for adoption after delivery.               Hospital course: no complications   section delivery  Patient is not breastfeedingThe patient's blood type is B POS. The baby's blood type is AB NEG. Rhogam is not indicated.    Subjective   Her pain is well controlled with current medications  She is passing flatus  She is ambulating well  She is tolerating a Adult diet Regular  She reports Baby with adoption agency  She denies emotional concerns today   Her plan for contraception is none         Objective   Allergies:   Abilify [aripiprazole], Penicillin g, and Zyrtec [cetirizine]         Last Vitals:  Temp Pulse Resp BP MAP Pulse Ox   37 °C (98.6 °F) 96 16 118/76 91 96 %     Vitals Min/Max Last 24 Hours:  Temp  Min: 36.4 °C (97.5 °F)  Max: 37.1 °C (98.8 °F)  Pulse  Min: 84  Max: 96  Resp  Min: 16  Max: 18  BP  Min: 110/55  Max: 128/81  MAP (mmHg)  Min: 72  Max: 99    Intake/Output:   No intake or output data in the 24 hours ending 24 0927    Physical Exam:  General: Examination reveals a well developed, well nourished, female, in no acute distress. She is alert and cooperative.  Abdomen: soft, gravid, nontender, nondistended, no abnormal masses, no epigastric pain.  Incision: healing well, no drainage, no erythema, well  approximated.  Fundus: firm and below umbilicus.  Perineum: minimal bleeding.  Extremities: no redness or tenderness in the calves or thighs, no edema.  Neurological: DTRs normal and symmetrical.  Psychological: awake and alert; oriented to person, place, and time.    Lab Data:

## 2024-09-28 NOTE — CARE PLAN
The patient's goals for the shift include pain control and discharge     The clinical goals for the shift include discharge

## 2024-09-28 NOTE — DISCHARGE SUMMARY
"Discharge Summary    Admission Date: 2024  Discharge Date: 2024    Discharge Diagnosis  Term pregnancy ([facility])    Hospital Course  Delivery Date: 2024 8:07 AM  Delivery type: , Low Transverse   GA at delivery: 39w2d  Outcome: Living  Anesthesia during delivery: Spinal  Intrapartum complications: Failure to Progress in First Stage  Feeding method: Breastfeeding Status: No     Procedures:    Contraception at discharge: none      Pertinent Physical Exam At Time of Discharge        Last Vitals:  Temp Pulse Resp BP MAP Pulse Ox   37 °C (98.6 °F) 96 16 118/76 91 96 %     Discharge Meds     Your medication list        START taking these medications        Instructions Last Dose Given Next Dose Due   acetaminophen 325 mg tablet  Commonly known as: Tylenol      Take 3 tablets (975 mg) by mouth every 6 hours for 10 days.       docusate sodium 100 mg capsule  Commonly known as: Colace      Take 1 capsule (100 mg) by mouth 2 times a day for 10 days.       ibuprofen 600 mg tablet      Take 1 tablet (600 mg) by mouth every 6 hours for 10 days.              CONTINUE taking these medications        Instructions Last Dose Given Next Dose Due   alcohol swabs pads, medicated      Use 1, up to 5 times a day       escitalopram 5 mg tablet  Commonly known as: Lexapro           pantoprazole 40 mg EC tablet  Commonly known as: ProtoNix      Take 1 tablet (40 mg) by mouth once daily. Do not crush, chew, or split.       pen needle, diabetic 32 gauge x 1/4\" needle  Commonly known as: BD Ultra-Fine Micro Pen Needle      Use 1 per injection BID during pregnancy       Vraylar 1.5 mg capsule  Generic drug: cariprazine                  STOP taking these medications      NovoLIN N FlexPen 100 unit/mL (3 mL) injection  Generic drug: insulin NPH (Isophane)                  Where to Get Your Medications        These medications were sent to [facility]  [address], [place] [address] "      Hours: 9 AM to 6 PM Mon-Fri, 9 AM to 1 PM Sat Phone: [phone]   acetaminophen 325 mg tablet  docusate sodium 100 mg capsule  ibuprofen 600 mg tablet          Complications Requiring Follow-Up  none    Test Results Pending At Discharge  Pending Labs       No current pending labs.            Outpatient Follow-Up  No future appointments.    I spent  minutes in the professional and overall care of this patient.      Allegra Saha MD

## 2024-09-28 NOTE — ANESTHESIA POSTPROCEDURE EVALUATION
Patient: Hortensia Castillo    Procedure Summary       Date: 24 Room / Location: [facility]    Anesthesia Start: 737 Anesthesia Stop: 846    Procedure: OBGYN Delivery  Section Diagnosis:       (Failure to Progress)      (Other (Add Comments))    Surgeons: Mariela Wasserman MD Responsible Provider: TIEN Montilla    Anesthesia Type: spinal ASA Status: 2            Anesthesia Type: spinal    Vitals Value Taken Time   /55 24 0611   Temp 36.4 °C (97.5 °F) 24 0610   Pulse 94 24 0611   Resp 16 24 0611   SpO2 96 % 24       Anesthesia Post Evaluation    Patient location during evaluation: bedside  Patient participation: complete - patient participated  Level of consciousness: awake and alert  Pain score: 0  Pain management: adequate  Airway patency: patent  Cardiovascular status: acceptable  Respiratory status: acceptable  Hydration status: acceptable  Postoperative Nausea and Vomiting: none        There were no known notable events for this encounter.

## 2024-10-18 ENCOUNTER — OFFICE VISIT (OUTPATIENT)
Dept: URGENT CARE | Age: 24
End: 2024-10-18
Payer: COMMERCIAL

## 2024-10-18 VITALS
DIASTOLIC BLOOD PRESSURE: 75 MMHG | OXYGEN SATURATION: 96 % | RESPIRATION RATE: 16 BRPM | BODY MASS INDEX: 33.42 KG/M2 | HEART RATE: 99 BPM | WEIGHT: 177 LBS | TEMPERATURE: 97.9 F | HEIGHT: 61 IN | SYSTOLIC BLOOD PRESSURE: 117 MMHG

## 2024-10-18 DIAGNOSIS — L03.012 PARONYCHIA OF LEFT MIDDLE FINGER: Primary | ICD-10-CM

## 2024-10-18 RX ORDER — SULFAMETHOXAZOLE AND TRIMETHOPRIM 800; 160 MG/1; MG/1
1 TABLET ORAL 2 TIMES DAILY
Qty: 10 TABLET | Refills: 0 | Status: SHIPPED | OUTPATIENT
Start: 2024-10-18 | End: 2024-10-23

## 2024-10-18 ASSESSMENT — PAIN SCALES - GENERAL: PAINLEVEL_OUTOF10: 3

## 2024-10-18 NOTE — PROGRESS NOTES
Chief Complaint   Patient presents with    left middle finger swollen     Redness, painfull        Physical Exam:     Left middle finger with paronychia, swelling from DIP distally with erythema     Procedure:   The left middle finger was prepped with chlorhexadine and draped in the usual sterile fashion. Using a 22G needle I pierced the overlying skin in the area of greatest fluctuance. The patient tolerated the procedure well.     Encounter Diagnosis   Name Primary?    Paronychia of left middle finger Yes        Medical Decision Making & Plan:   I and D in clinic  Dewayne Bingham, DO

## 2024-12-11 ENCOUNTER — LAB (OUTPATIENT)
Dept: LAB | Facility: LAB | Age: 24
End: 2024-12-11
Payer: COMMERCIAL

## 2024-12-11 DIAGNOSIS — B18.1 CHRONIC VIRAL HEPATITIS B WITHOUT DELTA-AGENT (MULTI): Primary | ICD-10-CM

## 2024-12-11 LAB
25(OH)D3 SERPL-MCNC: 23 NG/ML (ref 30–100)
ALBUMIN SERPL BCP-MCNC: 4 G/DL (ref 3.4–5)
ALP SERPL-CCNC: 91 U/L (ref 33–110)
ALT SERPL W P-5'-P-CCNC: 23 U/L (ref 7–45)
AMMONIA PLAS-SCNC: 49 UMOL/L (ref 16–53)
ANION GAP SERPL CALCULATED.3IONS-SCNC: 14 MMOL/L (ref 10–20)
AST SERPL W P-5'-P-CCNC: 16 U/L (ref 9–39)
BASOPHILS # BLD AUTO: 0.06 X10*3/UL (ref 0–0.1)
BASOPHILS NFR BLD AUTO: 0.8 %
BILIRUB SERPL-MCNC: 1 MG/DL (ref 0–1.2)
BUN SERPL-MCNC: 11 MG/DL (ref 6–23)
CALCIUM SERPL-MCNC: 8.8 MG/DL (ref 8.6–10.3)
CHLORIDE SERPL-SCNC: 103 MMOL/L (ref 98–107)
CO2 SERPL-SCNC: 24 MMOL/L (ref 21–32)
CREAT SERPL-MCNC: 0.57 MG/DL (ref 0.5–1.05)
EGFRCR SERPLBLD CKD-EPI 2021: >90 ML/MIN/1.73M*2
EOSINOPHIL # BLD AUTO: 0.17 X10*3/UL (ref 0–0.7)
EOSINOPHIL NFR BLD AUTO: 2.2 %
ERYTHROCYTE [DISTWIDTH] IN BLOOD BY AUTOMATED COUNT: 13.2 % (ref 11.5–14.5)
GLUCOSE SERPL-MCNC: 89 MG/DL (ref 74–99)
HCT VFR BLD AUTO: 48.1 % (ref 36–46)
HGB BLD-MCNC: 15.3 G/DL (ref 12–16)
IMM GRANULOCYTES # BLD AUTO: 0.03 X10*3/UL (ref 0–0.7)
IMM GRANULOCYTES NFR BLD AUTO: 0.4 % (ref 0–0.9)
LYMPHOCYTES # BLD AUTO: 2.4 X10*3/UL (ref 1.2–4.8)
LYMPHOCYTES NFR BLD AUTO: 30.4 %
MCH RBC QN AUTO: 27.1 PG (ref 26–34)
MCHC RBC AUTO-ENTMCNC: 31.8 G/DL (ref 32–36)
MCV RBC AUTO: 85 FL (ref 80–100)
MONOCYTES # BLD AUTO: 0.67 X10*3/UL (ref 0.1–1)
MONOCYTES NFR BLD AUTO: 8.5 %
NEUTROPHILS # BLD AUTO: 4.57 X10*3/UL (ref 1.2–7.7)
NEUTROPHILS NFR BLD AUTO: 57.7 %
NRBC BLD-RTO: 0 /100 WBCS (ref 0–0)
PLATELET # BLD AUTO: 295 X10*3/UL (ref 150–450)
POTASSIUM SERPL-SCNC: 4.3 MMOL/L (ref 3.5–5.3)
PROT SERPL-MCNC: 6.7 G/DL (ref 6.4–8.2)
RBC # BLD AUTO: 5.65 X10*6/UL (ref 4–5.2)
SODIUM SERPL-SCNC: 137 MMOL/L (ref 136–145)
VALPROATE SERPL-MCNC: 35 UG/ML (ref 50–100)
VIT B12 SERPL-MCNC: 553 PG/ML (ref 211–911)
WBC # BLD AUTO: 7.9 X10*3/UL (ref 4.4–11.3)

## 2024-12-11 PROCEDURE — 85025 COMPLETE CBC W/AUTO DIFF WBC: CPT

## 2024-12-11 PROCEDURE — 80053 COMPREHEN METABOLIC PANEL: CPT

## 2024-12-11 PROCEDURE — 82607 VITAMIN B-12: CPT

## 2024-12-11 PROCEDURE — 82140 ASSAY OF AMMONIA: CPT

## 2024-12-11 PROCEDURE — 82306 VITAMIN D 25 HYDROXY: CPT

## 2024-12-11 PROCEDURE — 80164 ASSAY DIPROPYLACETIC ACD TOT: CPT

## 2024-12-11 PROCEDURE — 36415 COLL VENOUS BLD VENIPUNCTURE: CPT

## 2025-01-09 ENCOUNTER — OFFICE VISIT (OUTPATIENT)
Dept: OPHTHALMOLOGY | Facility: CLINIC | Age: 25
End: 2025-01-09
Payer: COMMERCIAL

## 2025-01-09 DIAGNOSIS — H52.13 MYOPIA OF BOTH EYES WITH REGULAR ASTIGMATISM: Primary | ICD-10-CM

## 2025-01-09 DIAGNOSIS — H40.003 GLAUCOMA SUSPECT OF BOTH EYES: ICD-10-CM

## 2025-01-09 DIAGNOSIS — H52.223 MYOPIA OF BOTH EYES WITH REGULAR ASTIGMATISM: Primary | ICD-10-CM

## 2025-01-09 PROCEDURE — 92004 COMPRE OPH EXAM NEW PT 1/>: CPT | Performed by: STUDENT IN AN ORGANIZED HEALTH CARE EDUCATION/TRAINING PROGRAM

## 2025-01-09 PROCEDURE — 92015 DETERMINE REFRACTIVE STATE: CPT | Performed by: STUDENT IN AN ORGANIZED HEALTH CARE EDUCATION/TRAINING PROGRAM

## 2025-01-09 PROCEDURE — 92133 CPTRZD OPH DX IMG PST SGM ON: CPT | Performed by: STUDENT IN AN ORGANIZED HEALTH CARE EDUCATION/TRAINING PROGRAM

## 2025-01-09 RX ORDER — DIVALPROEX SODIUM 125 MG/1
125 TABLET, DELAYED RELEASE ORAL 3 TIMES DAILY
COMMUNITY

## 2025-01-09 RX ORDER — TRAZODONE HYDROCHLORIDE 50 MG/1
50 TABLET ORAL NIGHTLY
COMMUNITY

## 2025-01-09 ASSESSMENT — REFRACTION_MANIFEST
OD_CYLINDER: -4.00
OD_SPHERE: -5.75
OS_AXIS: 010
OS_SPHERE: -5.00
OD_AXIS: 175
OS_CYLINDER: -4.00

## 2025-01-09 ASSESSMENT — ENCOUNTER SYMPTOMS
NEUROLOGICAL NEGATIVE: 0
GASTROINTESTINAL NEGATIVE: 0
CONSTITUTIONAL NEGATIVE: 0
RESPIRATORY NEGATIVE: 0
PSYCHIATRIC NEGATIVE: 0
CARDIOVASCULAR NEGATIVE: 0
ENDOCRINE NEGATIVE: 0
ALLERGIC/IMMUNOLOGIC NEGATIVE: 0
HEMATOLOGIC/LYMPHATIC NEGATIVE: 0
EYES NEGATIVE: 1
MUSCULOSKELETAL NEGATIVE: 0

## 2025-01-09 ASSESSMENT — CUP TO DISC RATIO
OS_RATIO: .75
OD_RATIO: .75

## 2025-01-09 ASSESSMENT — CONF VISUAL FIELD
OD_SUPERIOR_TEMPORAL_RESTRICTION: 0
OD_INFERIOR_TEMPORAL_RESTRICTION: 0
METHOD: COUNTING FINGERS
OS_NORMAL: 1
OD_NORMAL: 1
OD_INFERIOR_NASAL_RESTRICTION: 0
OS_SUPERIOR_TEMPORAL_RESTRICTION: 0
OD_SUPERIOR_NASAL_RESTRICTION: 0
OS_INFERIOR_NASAL_RESTRICTION: 0
OS_INFERIOR_TEMPORAL_RESTRICTION: 0
OS_SUPERIOR_NASAL_RESTRICTION: 0

## 2025-01-09 ASSESSMENT — REFRACTION_WEARINGRX
OD_SPHERE: -6.00
OD_AXIS: 171
OS_AXIS: 180
OS_CYLINDER: -4.00
OS_SPHERE: -5.75
OD_CYLINDER: -4.00

## 2025-01-09 ASSESSMENT — VISUAL ACUITY
OS_CC+: -2
OS_CC: 20/30
OD_CC: 20/40
OD_CC+: -2
CORRECTION_TYPE: GLASSES
METHOD: SNELLEN - LINEAR

## 2025-01-09 ASSESSMENT — SLIT LAMP EXAM - LIDS
COMMENTS: NORMAL
COMMENTS: NORMAL

## 2025-01-09 ASSESSMENT — EXTERNAL EXAM - LEFT EYE: OS_EXAM: NORMAL

## 2025-01-09 ASSESSMENT — TONOMETRY: IOP_METHOD: GOLDMANN APPLANATION

## 2025-01-09 ASSESSMENT — EXTERNAL EXAM - RIGHT EYE: OD_EXAM: NORMAL

## 2025-01-09 NOTE — PROGRESS NOTES
Assessment/Plan   Diagnoses and all orders for this visit:  Myopia of both eyes with regular astigmatism  -New spec rx released today per patient request. Monitor 1 year or sooner prn. Refraction billed today.  Glaucoma suspect of both eyes  -new patient to clinic with no prior testing  -unable to obtain accurate IOP reading today-patient very sensitive to objects/fingers coming close to the eye-would not allow the eyelids to get held open and patient was closing eyes  -ONH with large discs bilateral and corresponding cupping  -baseline OCT RNFL today 1/9/25 within normal limits  -Pt ed on findings-will monitor annually for any signs of progression    RTC 1 year for annual with DFE and MRX and OCT RNFL

## 2025-03-31 ENCOUNTER — OFFICE VISIT (OUTPATIENT)
Dept: URGENT CARE | Age: 25
End: 2025-03-31
Payer: MEDICAID

## 2025-03-31 VITALS
HEART RATE: 93 BPM | OXYGEN SATURATION: 97 % | DIASTOLIC BLOOD PRESSURE: 75 MMHG | SYSTOLIC BLOOD PRESSURE: 121 MMHG | WEIGHT: 166 LBS | RESPIRATION RATE: 18 BRPM | HEIGHT: 61 IN | TEMPERATURE: 98.1 F | BODY MASS INDEX: 31.34 KG/M2

## 2025-03-31 DIAGNOSIS — B96.89 ACUTE BACTERIAL BRONCHITIS: Primary | ICD-10-CM

## 2025-03-31 DIAGNOSIS — J20.8 ACUTE BACTERIAL BRONCHITIS: Primary | ICD-10-CM

## 2025-03-31 PROCEDURE — 3008F BODY MASS INDEX DOCD: CPT | Performed by: SURGERY

## 2025-03-31 PROCEDURE — 99213 OFFICE O/P EST LOW 20 MIN: CPT | Performed by: SURGERY

## 2025-03-31 PROCEDURE — 3074F SYST BP LT 130 MM HG: CPT | Performed by: SURGERY

## 2025-03-31 PROCEDURE — 3078F DIAST BP <80 MM HG: CPT | Performed by: SURGERY

## 2025-03-31 RX ORDER — AZITHROMYCIN 250 MG/1
TABLET, FILM COATED ORAL
Qty: 6 TABLET | Refills: 0 | Status: SHIPPED | OUTPATIENT
Start: 2025-03-31 | End: 2025-04-05

## 2025-04-03 NOTE — PROGRESS NOTES
Chief Complaint   Patient presents with    Cough     3 DAYS        Physical Exam:     GEN: No acute distress    ENT: Bilateral TMs and canals unremarkable, sinus congestion present. Pharynx and tonsils mildly hyperemic but without exudate.     Resp: Lungs clear to auscultation bilaterally         Assessment:     Encounter Diagnosis   Name Primary?    Acute bacterial bronchitis Yes          Medical Decision Making & Plan:   Azithromycin      Home      04/03/25 at 8:25 AM - Lynda Bingham DO

## 2025-04-28 PROCEDURE — 88175 CYTOPATH C/V AUTO FLUID REDO: CPT

## 2025-04-29 ENCOUNTER — LAB REQUISITION (OUTPATIENT)
Dept: LAB | Facility: HOSPITAL | Age: 25
End: 2025-04-29
Payer: MEDICAID

## 2025-04-29 DIAGNOSIS — Z01.419 ENCOUNTER FOR GYNECOLOGICAL EXAMINATION (GENERAL) (ROUTINE) WITHOUT ABNORMAL FINDINGS: ICD-10-CM

## 2025-05-15 LAB
CYTOLOGY CMNT CVX/VAG CYTO-IMP: NORMAL
LAB AP HPV GENOTYPE QUESTION: YES
LAB AP HPV HR: NORMAL
LABORATORY COMMENT REPORT: NORMAL
PATH REPORT.TOTAL CANCER: NORMAL

## 2025-06-30 ENCOUNTER — OFFICE VISIT (OUTPATIENT)
Dept: URGENT CARE | Age: 25
End: 2025-06-30
Payer: MEDICAID

## 2025-06-30 VITALS
HEIGHT: 61 IN | SYSTOLIC BLOOD PRESSURE: 130 MMHG | TEMPERATURE: 98.4 F | RESPIRATION RATE: 18 BRPM | OXYGEN SATURATION: 97 % | WEIGHT: 178 LBS | DIASTOLIC BLOOD PRESSURE: 74 MMHG | HEART RATE: 85 BPM | BODY MASS INDEX: 33.61 KG/M2

## 2025-06-30 DIAGNOSIS — R11.10 VOMITING, UNSPECIFIED VOMITING TYPE, UNSPECIFIED WHETHER NAUSEA PRESENT: Primary | ICD-10-CM

## 2025-06-30 RX ORDER — DIVALPROEX SODIUM 250 MG/1
TABLET, DELAYED RELEASE ORAL
COMMUNITY
Start: 2025-06-10

## 2025-06-30 RX ORDER — DIVALPROEX SODIUM 500 MG/1
TABLET, DELAYED RELEASE ORAL
COMMUNITY
Start: 2025-06-10

## 2025-06-30 RX ORDER — ESCITALOPRAM OXALATE 10 MG/1
TABLET ORAL
COMMUNITY
Start: 2025-06-10

## 2025-06-30 RX ORDER — BLOOD SUGAR DIAGNOSTIC
STRIP MISCELLANEOUS
COMMUNITY
Start: 2024-08-26

## 2025-06-30 RX ORDER — LURASIDONE HYDROCHLORIDE 80 MG/1
TABLET, FILM COATED ORAL
COMMUNITY
Start: 2025-06-10

## 2025-06-30 RX ORDER — PROPRANOLOL HYDROCHLORIDE 20 MG/1
TABLET ORAL
COMMUNITY
Start: 2025-06-10

## 2025-06-30 RX ORDER — LANCETS 30 GAUGE
EACH MISCELLANEOUS
COMMUNITY
Start: 2024-08-21

## 2025-06-30 RX ORDER — TRAZODONE HYDROCHLORIDE 150 MG/1
1 TABLET ORAL
COMMUNITY
Start: 2025-02-13

## 2025-06-30 ASSESSMENT — PAIN SCALES - GENERAL: PAINLEVEL_OUTOF10: 0-NO PAIN

## 2025-06-30 NOTE — LETTER
June 30, 2025     Patient: Hortensia Castillo   YOB: 2000   Date of Visit: 6/30/2025       To Whom It May Concern:    Hortensia Castillo was seen in my clinic on 6/30/2025 at 9:45 am. Please excuse Hortensia for her absence from work on this day to make the appointment. Patient cleared for full duty return to work. No evidence of fever or contagious viral gastroenteritis.     If you have any questions or concerns, please don't hesitate to call.         Sincerely,         Lily Cutler PA-C        CC: No Recipients

## 2025-06-30 NOTE — PROGRESS NOTES
"Subjective   Patient ID: Hortensia Castillo is a 25 y.o. female who presents for Vomiting (Patient states she drank  tea and vomited last night and needs a note for work).  History of Present Illness  Hortensia Castillo is a 25 year old female who presents with an episode of vomiting. 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 No fever accompanied the vomiting. She is on the Depo-Provera shot and has not missed any doses. She suspects the vomiting may have been caused by consuming  matcha tea at work. She has no known history of reflux or GERD symptoms. Denies abdominal pain or diarrhea. She took a negative pregnancy test at home.     ROS is negative unless otherwise stated in HPI.       Objective     /74 (BP Location: Left arm, Patient Position: Sitting)   Pulse 85   Temp 36.9 °C (98.4 °F) (Oral)   Resp 18   Ht (!) 1.549 m (5' 1\")   Wt 80.7 kg (178 lb)   LMP 2025 (Within Weeks)   SpO2 97%   BMI 33.63 kg/m²        VS: As documented in the triage note and EMR flowsheet from this visit was reviewed  General: Well appearing. No acute distress.   Eyes:  Extraocular movements grossly intact. No scleral icterus.   Head: Atraumatic. Normocephalic.     Neck: No meningismus. No gross masses. Full movement through range of motion  CV: Regular rhythm. No murmurs, rubs, gallops appreciated.   Resp: Clear to auscultation bilaterally. No respiratory distress.    GI: Nontender. Soft. No masses. No rebound, rigidity or guarding.   MSK: Symmetric muscle bulk. No gross step offs or deformities.  Skin: Warm, dry. No rashes  Neuro: CN II-VII intact. A&O x3. Speech fluent. Alert. Moving all extremities. Ambulates with normal gait  Psych: Appropriate mood and affect for situation      Point of Care Test & Imaging Results from this visit  No results found for this visit on 25.   Imaging  No results found.    Cardiology, Vascular, and Other Imaging  No other " imaging results found for the past 2 days      Diagnostic study results (if any) were reviewed by Lily Cutler PA-C.    Assessment/Plan   Allergies, medications, history, and pertinent labs/EKGs/Imaging reviewed by Lily Cutler PA-C.     Assessment & Plan  Patient is a 25-year-old female who presents for 1 episode of vomiting that occurred last night.She experienced an acute episode of vomiting last night at midnight while at work.  Symptoms are currently resolved.  No current symptomatology.  There is no fever or signs of contagious gastroenteritis. Abdominal exam is benign. A home pregnancy test was negative.  Vitals are stable.  Provided a note indicating no evidence of fever or contagious gastroenteritis.    Orders and Diagnoses  Diagnoses and all orders for this visit:  Vomiting, unspecified vomiting type, unspecified whether nausea present        Disposition:  Home      Lily Cutler PA-C     This medical note was created with the assistance of artificial intelligence (AI) for documentation purposes. The content has been reviewed and confirmed by the healthcare provider for accuracy and completeness. Patient consented to the use of audio recording and use of AI during their visit.

## 2025-08-04 ENCOUNTER — OFFICE VISIT (OUTPATIENT)
Dept: URGENT CARE | Age: 25
End: 2025-08-04
Payer: MEDICAID

## 2025-08-04 VITALS
DIASTOLIC BLOOD PRESSURE: 80 MMHG | HEART RATE: 78 BPM | TEMPERATURE: 98.6 F | OXYGEN SATURATION: 99 % | BODY MASS INDEX: 35.12 KG/M2 | SYSTOLIC BLOOD PRESSURE: 124 MMHG | HEIGHT: 61 IN | WEIGHT: 186 LBS | RESPIRATION RATE: 20 BRPM

## 2025-08-04 DIAGNOSIS — K12.0 APHTHOUS ULCER: Primary | ICD-10-CM

## 2025-08-04 PROCEDURE — 3008F BODY MASS INDEX DOCD: CPT | Performed by: PHYSICIAN ASSISTANT

## 2025-08-04 PROCEDURE — 3074F SYST BP LT 130 MM HG: CPT | Performed by: PHYSICIAN ASSISTANT

## 2025-08-04 PROCEDURE — 3079F DIAST BP 80-89 MM HG: CPT | Performed by: PHYSICIAN ASSISTANT

## 2025-08-04 PROCEDURE — 99213 OFFICE O/P EST LOW 20 MIN: CPT | Performed by: PHYSICIAN ASSISTANT

## 2025-08-04 NOTE — LETTER
August 4, 2025     Patient: Hortensia Castillo   YOB: 2000   Date of Visit: 8/4/2025       To Whom It May Concern:    Hortensia Castillo was seen in my clinic on 8/4/2025 at 11:10 am. Please excuse Hortensia for her absence from work on this day to make the appointment. Patient evaluated and is medically cleared to go back to work full duty.    If you have any questions or concerns, please don't hesitate to call.         Sincerely,         Lily Cutler PA-C        CC: No Recipients

## 2025-08-04 NOTE — PROGRESS NOTES
"Subjective   Patient ID: Hortensia Castillo is a 25 y.o. female who presents for Mouth Lesions.  History of Present Illness  Hortensia Castillo is a 25 year old female who presents with a painful white spot on the right side of her tongue. A painful white spot on the right side of her tongue, near the molars, began yesterday. Today, the pain is slightly improved but still affects her ability to talk. She has been using Orajel with significant pain relief . 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     ROS is negative unless otherwise stated in HPI.       Objective     /80   Pulse 78   Temp 37 °C (98.6 °F) (Oral)   Resp 20   Ht (!) 1.549 m (5' 1\")   Wt 84.4 kg (186 lb)   SpO2 99%   Breastfeeding No   BMI 35.14 kg/m²        VS: As documented in the triage note and EMR flowsheet from this visit was reviewed  General: Well appearing. No acute distress.   Eyes:  Extraocular movements grossly intact. No scleral icterus.   Head: Atraumatic. Normocephalic.     Neck: No meningismus. No gross masses. Full movement through range of motion  ENT: Small aphthous ulcer to the right lower gingiva  CV: Regular rhythm. No murmurs, rubs, gallops appreciated.   Resp: Clear to auscultation bilaterally. No respiratory distress.    Neuro: CN II-VII intact. A&O x3. Speech fluent. Alert. Moving all extremities. Ambulates with normal gait  Psych: Appropriate mood and affect for situation      Point of Care Test & Imaging Results from this visit  No results found for this visit on 08/04/25.   Imaging  No results found.    Cardiology, Vascular, and Other Imaging  No other imaging results found for the past 2 days      Diagnostic study results (if any) were reviewed by Lily Cutler PA-C.    Assessment/Plan   Allergies, medications, history, and pertinent labs/EKGs/Imaging reviewed by Lily Cutler PA-C.     Assessment & Plan  Patient is a 25-year-old female for aphthous ulcer.  Notes improvement " with topical Orajel however works in a strict environment where she requires return to work excuse stating she was evaluated.  On examination, patient well-appearing.  Vitals are stable.  She has an aphthous ulcer to the right lower mucosa without evidence of superimposed infection.  Declines need for prescription Magic mouthwash.  Provided note for work.    Orders and Diagnoses  Diagnoses and all orders for this visit:  Aphthous ulcer        Disposition:  Home      Lily Cutler PA-C     This medical note was created with the assistance of artificial intelligence (AI) for documentation purposes. The content has been reviewed and confirmed by the healthcare provider for accuracy and completeness. Patient consented to the use of audio recording and use of AI during their visit.

## (undated) DEVICE — SUTURE, VICRYL, 3-0, 27 IN, CT-2, VIOLET

## (undated) DEVICE — CAUTERY, PENCIL, PUSH BUTTON, SMOKE EVAC, 70MM

## (undated) DEVICE — PREP TRAY, VAGINAL

## (undated) DEVICE — Device

## (undated) DEVICE — SLEEVE, VASO PRESS, CALF GARMENT, MEDIUM, GREEN

## (undated) DEVICE — GLOVE, SURGICAL, PROTEXIS PI , 6.5, PF, LF

## (undated) DEVICE — GLOVE, SURGICAL, PROTEXIS PI , 6.0, PF, LF

## (undated) DEVICE — SUTURE, VICRYL, 4-0, 27 IN, KS, UNDYED

## (undated) DEVICE — SUTURE, VICRYL 0, 36 IN, CT-1, VIOLET

## (undated) DEVICE — PAD, GROUNDING, ELECTROSURGICAL, W/9 FT CABLE, POLYHESIVE II, ADULT, LF